# Patient Record
Sex: FEMALE | Race: BLACK OR AFRICAN AMERICAN | Employment: OTHER | URBAN - METROPOLITAN AREA
[De-identification: names, ages, dates, MRNs, and addresses within clinical notes are randomized per-mention and may not be internally consistent; named-entity substitution may affect disease eponyms.]

---

## 2017-02-28 ENCOUNTER — OFFICE VISIT (OUTPATIENT)
Dept: INTERNAL MEDICINE CLINIC | Age: 82
End: 2017-02-28

## 2017-02-28 VITALS
HEART RATE: 60 BPM | SYSTOLIC BLOOD PRESSURE: 124 MMHG | TEMPERATURE: 97.9 F | DIASTOLIC BLOOD PRESSURE: 66 MMHG | HEIGHT: 65 IN | WEIGHT: 136.4 LBS | OXYGEN SATURATION: 99 % | BODY MASS INDEX: 22.73 KG/M2

## 2017-02-28 DIAGNOSIS — I10 ESSENTIAL HYPERTENSION, BENIGN: ICD-10-CM

## 2017-02-28 DIAGNOSIS — G56.02 CARPAL TUNNEL SYNDROME ON LEFT: ICD-10-CM

## 2017-02-28 DIAGNOSIS — M85.80 OSTEOPENIA: ICD-10-CM

## 2017-02-28 DIAGNOSIS — E11.9 TYPE 2 DIABETES MELLITUS WITHOUT COMPLICATION, WITHOUT LONG-TERM CURRENT USE OF INSULIN (HCC): Primary | ICD-10-CM

## 2017-02-28 DIAGNOSIS — E78.00 PURE HYPERCHOLESTEROLEMIA: ICD-10-CM

## 2017-02-28 NOTE — MR AVS SNAPSHOT
Visit Information Date & Time Provider Department Dept. Phone Encounter #  
 2/28/2017  8:45 AM Anna Saenz, [address] [place] [address] [phone] [id number] Follow-up Instructions Return in about 6 months (around 8/28/2017) for dm-2 htn hld. Upcoming Health Maintenance Date Due DTaP/Tdap/Td series (1 - Tdap) 11/20/1956 ZOSTER VACCINE AGE 60> 11/20/1995 OSTEOPOROSIS SCREENING (DEXA) 11/20/2000 FOOT EXAM Q1 10/19/2016 MEDICARE YEARLY EXAM 2/9/2017 HEMOGLOBIN A1C Q6M 2/16/2017 MICROALBUMIN Q1 4/19/2017 LIPID PANEL Q1 4/19/2017 EYE EXAM RETINAL OR DILATED Q1 6/16/2017 GLAUCOMA SCREENING Q2Y 6/16/2018 COLONOSCOPY 2/28/2027 Allergies as of 2/28/2017  Review Complete On: 2/28/2017 By: Pieter Sahni LPN No Known Allergies Current Immunizations  Reviewed on 11/23/2010 Name Date H1N1 FLU VACCINE 1/12/2010 Influenza Vaccine 10/5/2015 Influenza Vaccine Split 11/23/2010 Pneumococcal Conjugate (PCV-13) 4/2/2015 Pneumococcal Polysaccharide (PPSV-23) 1/17/2013 Not reviewed this visit You Were Diagnosed With   
  
 Codes Comments Type 2 diabetes mellitus without complication, without long-term current use of insulin (HCC)    -  Primary ICD-10-CM: E11.9 ICD-9-CM: 250.00 Essential hypertension, benign     ICD-10-CM: I10 
ICD-9-CM: 401.1 Pure hypercholesterolemia     ICD-10-CM: E78.00 ICD-9-CM: 272.0 Osteopenia     ICD-10-CM: M85.80 ICD-9-CM: 733.90 Carpal tunnel syndrome on left     ICD-10-CM: G56.02 
ICD-9-CM: 354.0 Vitals BP  
  
  
  
  
  
 124/66 (BP 1 Location: Left arm, BP Patient Position: Sitting) BMI and BSA Data Body Mass Index Body Surface Area 23.05 kg/m 2 1.68 m 2 Preferred Pharmacy Pharmacy Name Phone [facility] [address] [place] [address] [phone] Your Updated Medication List  
  
   
 This list is accurate as of: 2/28/17  9:43 AM.  Always use your most recent med list. amLODIPine 10 mg tablet Commonly known as:  Mariluz Fraction Take 1 Tab by mouth daily. aspirin 81 mg tablet Take  by mouth daily. atorvastatin 20 mg tablet Commonly known as:  LIPITOR  
TAKE ONE TABLET BY MOUTH EVERY OTHER DAY  
  
 glucose blood VI test strips strip Commonly known as:  ASCENSIA AUTODISC VI, ONE TOUCH ULTRA TEST VI  
Use to check blood sugars once daily E11.9  
  
 hydrALAZINE 10 mg tablet Commonly known as:  APRESOLINE  
TAKE TWO AND ONE-HALF TABLETS BY MOUTH THREE TIMES DAILY Lancets Misc Use to check blood sugars once daily E11.9  
  
 lisinopril-hydroCHLOROthiazide 20-12.5 mg per tablet Commonly known as:  PRINZIDE, ZESTORETIC  
TAKE TWO TABLETS BY MOUTH ONCE DAILY  
  
 metFORMIN 500 mg tablet Commonly known as:  GLUCOPHAGE Take one tablet in the AM and two tablets in the PM  
  
 metoprolol succinate 100 mg tablet Commonly known as:  TOPROL-XL  
TAKE ONE TABLET BY MOUTH ONCE DAILY [place]-3-DHA-EPA-Fish Oil 1,000 mg (120 mg-180 mg) Cap Take  by mouth. temazepam 15 mg capsule Commonly known as:  RESTORIL  
TAKE ONE CAPSULE BY MOUTH AT BEDTIME AS NEEDED FOR SLEEP  
  
 VITAMIN D3 1,000 unit Cap Generic drug:  cholecalciferol Take  by mouth. We Performed the Following CBC W/O DIFF [07317 CPT(R)] HEMOGLOBIN A1C WITH EAG [35619 CPT(R)] LIPID PANEL [77992 CPT(R)] METABOLIC PANEL, COMPREHENSIVE [37812 CPT(R)] REFERRAL TO ORTHOPEDIC SURGERY [REF62 Custom] Comments:  
 Please evaluate patient for left carpal syndrome Follow-up Instructions Return in about 6 months (around 8/28/2017) for dm-2 htn hld. To-Do List   
 03/06/2017 Imaging:  DEXA BONE DENSITY STUDY AXIAL Referral Information Referral ID Referred By Referred To [id number] Jose [address], [address] Visits Status Start Date End Date 1 New Request 2/28/17 2/28/18 If your referral has a status of pending review or denied, additional information will be sent to support the outcome of this decision. Introducing [facility] & HEALTH SERVICES! [facility] introduces [email or web address] patient portal. Now you can access parts of your medical record, email your doctor's office, and request medication refills online. 1. In your internet browser, go to [email or web address] 2. Click on the First Time User? Click Here link in the Sign In box. You will see the New Member Sign Up page. 3. Enter your [email or web address] Access Code exactly as it appears below. You will not need to use this code after youve completed the sign-up process. If you do not sign up before the expiration date, you must request a new code. · [email or web address] Access Code: [facility] Expires: 5/29/2017  9:43 AM 
 
4. Enter the last four digits of your Social Security Number (xxxx) and Date of Birth (mm/dd/yyyy) as indicated and click Submit. You will be taken to the next sign-up page. 5. Create a [email or web address] ID. This will be your [email or web address] login ID and cannot be changed, so think of one that is secure and easy to remember. 6. Create a [email or web address] password. You can change your password at any time. 7. Enter your Password Reset Question and Answer. This can be used at a later time if you forget your password. 8. Enter your e-mail address. You will receive e-mail notification when new information is available in [address] 9. Click Sign Up. You can now view and download portions of your medical record. 10. Click the Download Summary menu link to download a portable copy of your medical information. If you have questions, please visit the Frequently Asked Questions section of the [email or web address] website. Remember, [email or web address] is NOT to be used for urgent needs. For medical emergencies, dial 911. Now available from your iPhone and Android! Please provide this summary of care documentation to your next provider. Your primary care clinician is listed as Jaren GALEANO. If you have any questions after today's visit, please call [phone].

## 2017-02-28 NOTE — PROGRESS NOTES
HISTORY OF PRESENT ILLNESS  Farrukh Cortes is a 80 y.o. female. HPI     F/u DM-2 HTN  C/o left hand numbness left hand fingerall 5 for years. sxs occur at night  S/p right carpal release years Dr Sofía Dent. fsbs < 100 in morning per patient, lowest was 80  Goes to [facility] q 6 months    Patient Active Problem List    Diagnosis Date Noted    Bilateral carotid artery stenosis 05/16/2016    Chest pain, unspecified 07/17/2012    Insomnia 01/12/2010    Family history of breast cancer in first degree relative 01/12/2010    Type 2 diabetes mellitus without complication ([facility] 91/74/0153    Essential hypertension, benign 06/29/2009    Pure hypercholesterolemia 06/29/2009     Current Outpatient Prescriptions   Medication Sig Dispense Refill    temazepam (RESTORIL) 15 mg capsule TAKE ONE CAPSULE BY MOUTH AT BEDTIME AS NEEDED FOR SLEEP 30 Cap 3    atorvastatin (LIPITOR) 20 mg tablet TAKE ONE TABLET BY MOUTH EVERY OTHER DAY 30 Tab 11    metoprolol succinate (TOPROL-XL) 100 mg tablet TAKE ONE TABLET BY MOUTH ONCE DAILY 30 Tab 11    lisinopril-hydrochlorothiazide (PRINZIDE, ZESTORETIC) 20-12.5 mg per tablet TAKE TWO TABLETS BY MOUTH ONCE DAILY 60 Tab 11    glucose blood VI test strips (ASCENSIA AUTODISC VI, ONE TOUCH ULTRA TEST VI) strip Use to check blood sugars once daily E11.9 100 Strip 11    Lancets misc Use to check blood sugars once daily E11.9 100 Each 11    metFORMIN (GLUCOPHAGE) 500 mg tablet Take one tablet in the AM and two tablets in the PM 90 Tab 10    amLODIPine (NORVASC) 10 mg tablet Take 1 Tab by mouth daily. 90 Tab 3    hydrALAZINE (APRESOLINE) 10 mg tablet TAKE TWO AND ONE-HALF TABLETS BY MOUTH THREE TIMES DAILY 225 Tab 1    Cholecalciferol, Vitamin D3, (VITAMIN D3) 1,000 unit Cap Take  by mouth.  Omega-3-DHA-EPA-Fish Oil 1,000 (120-180) mg Cap Take  by mouth.  aspirin 81 mg Tab Take  by mouth daily.        No Known Allergies   Lab Results  Component Value Date/Time Hemoglobin A1c 6.9 08/16/2016 09:21 AM   Hemoglobin A1c 7.0 07/06/2012 09:15 AM   Hemoglobin A1c 6.8 01/05/2012 09:09 AM   Hemoglobin A1c, External 7.0 on 10/02/2014 Dr. Rosendo Pop 10/02/2014   Hemoglobin A1c, External 6.6 on 10/2/14 Dr. Rosnedo Pop 10/02/2014   Glucose 115 08/16/2016 09:21 AM   Glucose (POC) 113 07/25/2012 01:49 PM   Microalb/Creat ratio (ug/mg creat.) <5.6 04/19/2016 09:12 AM   LDL, calculated 53 04/19/2016 09:12 AM   Creatinine 0.73 08/16/2016 09:21 AM      Lab Results  Component Value Date/Time   Cholesterol, total 144 04/19/2016 09:12 AM   HDL Cholesterol 78 04/19/2016 09:12 AM   LDL, calculated 53 04/19/2016 09:12 AM   Triglyceride 65 04/19/2016 09:12 AM   CHOL/HDL Ratio 2.2 07/30/2010 09:21 AM       Lab Results  Component Value Date/Time   GFR est AA 90 08/16/2016 09:21 AM   GFR est non-AA 78 08/16/2016 09:21 AM   Creatinine 0.73 08/16/2016 09:21 AM   BUN 10 08/16/2016 09:21 AM   Sodium 146 08/16/2016 09:21 AM   Potassium 4.4 08/16/2016 09:21 AM   Chloride 100 08/16/2016 09:21 AM   CO2 24 08/16/2016 09:21 AM         ROS    Physical Exam   Constitutional: She appears well-developed and well-nourished. No distress. Appears stated age   Cardiovascular: Normal rate, regular rhythm and normal heart sounds. Exam reveals no gallop and no friction rub. No murmur heard. Pulmonary/Chest: Effort normal and breath sounds normal. No respiratory distress. She has no wheezes. Abdominal: Soft. Bowel sounds are normal. She exhibits no distension and no mass. There is no tenderness. There is no rebound and no guarding. Musculoskeletal: She exhibits no edema. Neurological: She is alert. Positive Tinels left wrist  Hand  ? Slightly diminished   Psychiatric: She has a normal mood and affect. Nursing note and vitals reviewed. ASSESSMENT and PLAN  Allielove was seen today for diabetes and hypertension.     Diagnoses and all orders for this visit:    Type 2 diabetes mellitus without complication, without long-term current use of insulin (HCC)  -     CBC W/O DIFF  -     METABOLIC PANEL, COMPREHENSIVE  -     HEMOGLOBIN A1C WITH EAG   Good control per home readings   Foot exam next visit    Essential hypertension, benign  -     METABOLIC PANEL, COMPREHENSIVE   controlled    Pure hypercholesterolemia  -     METABOLIC PANEL, COMPREHENSIVE  -     LIPID PANEL   Continue statin    Osteopenia  -     DEXA BONE DENSITY STUDY AXIAL; Future   Prior study from former PCP about 4 yrs ago per pt--osteopenia    Carpal tunnel syndrome on left  -     REFERRAL TO ORTHOPEDIC SURGERY      Follow-up Disposition:  Return in about 6 months (around 8/28/2017) for dm-2 htn hld.

## 2017-03-01 LAB
ALBUMIN SERPL-MCNC: 4.3 G/DL (ref 3.5–4.7)
ALBUMIN/GLOB SERPL: 1.7 {RATIO} (ref 1.1–2.5)
ALP SERPL-CCNC: 92 IU/L (ref 39–117)
ALT SERPL-CCNC: 13 IU/L (ref 0–32)
AST SERPL-CCNC: 21 IU/L (ref 0–40)
BILIRUB SERPL-MCNC: 0.2 MG/DL (ref 0–1.2)
BUN SERPL-MCNC: 12 MG/DL (ref 8–27)
BUN/CREAT SERPL: 17 (ref 11–26)
CALCIUM SERPL-MCNC: 9.8 MG/DL (ref 8.7–10.3)
CHLORIDE SERPL-SCNC: 94 MMOL/L (ref 96–106)
CHOLEST SERPL-MCNC: 153 MG/DL (ref 100–199)
CO2 SERPL-SCNC: 24 MMOL/L (ref 18–29)
CREAT SERPL-MCNC: 0.71 MG/DL (ref 0.57–1)
ERYTHROCYTE [DISTWIDTH] IN BLOOD BY AUTOMATED COUNT: 14.7 % (ref 12.3–15.4)
EST. AVERAGE GLUCOSE BLD GHB EST-MCNC: 148 MG/DL
GLOBULIN SER CALC-MCNC: 2.6 G/DL (ref 1.5–4.5)
GLUCOSE SERPL-MCNC: 106 MG/DL (ref 65–99)
HBA1C MFR BLD: 6.8 % (ref 4.8–5.6)
HCT VFR BLD AUTO: 35.1 % (ref 34–46.6)
HDLC SERPL-MCNC: 69 MG/DL
HGB BLD-MCNC: 11.6 G/DL (ref 11.1–15.9)
LDLC SERPL CALC-MCNC: 71 MG/DL (ref 0–99)
MCH RBC QN AUTO: 28.4 PG (ref 26.6–33)
MCHC RBC AUTO-ENTMCNC: 33 G/DL (ref 31.5–35.7)
MCV RBC AUTO: 86 FL (ref 79–97)
PLATELET # BLD AUTO: 270 X10E3/UL (ref 150–379)
POTASSIUM SERPL-SCNC: 4 MMOL/L (ref 3.5–5.2)
PROT SERPL-MCNC: 6.9 G/DL (ref 6–8.5)
RBC # BLD AUTO: 4.09 X10E6/UL (ref 3.77–5.28)
SODIUM SERPL-SCNC: 135 MMOL/L (ref 134–144)
TRIGL SERPL-MCNC: 64 MG/DL (ref 0–149)
VLDLC SERPL CALC-MCNC: 13 MG/DL (ref 5–40)
WBC # BLD AUTO: 4.7 X10E3/UL (ref 3.4–10.8)

## 2017-04-20 ENCOUNTER — TELEPHONE (OUTPATIENT)
Dept: INTERNAL MEDICINE CLINIC | Age: 82
End: 2017-04-20

## 2017-08-17 DIAGNOSIS — E11.9 TYPE 2 DIABETES MELLITUS WITHOUT COMPLICATION, WITHOUT LONG-TERM CURRENT USE OF INSULIN (HCC): ICD-10-CM

## 2017-08-17 RX ORDER — LANCETS
EACH MISCELLANEOUS
Qty: 100 EACH | Refills: 11 | Status: SHIPPED | OUTPATIENT
Start: 2017-08-17 | End: 2018-08-27 | Stop reason: SDUPTHER

## 2017-08-28 ENCOUNTER — OFFICE VISIT (OUTPATIENT)
Dept: INTERNAL MEDICINE CLINIC | Age: 82
End: 2017-08-28

## 2017-08-28 VITALS
SYSTOLIC BLOOD PRESSURE: 123 MMHG | DIASTOLIC BLOOD PRESSURE: 66 MMHG | HEIGHT: 65 IN | WEIGHT: 135 LBS | BODY MASS INDEX: 22.49 KG/M2 | OXYGEN SATURATION: 99 % | TEMPERATURE: 98 F | HEART RATE: 59 BPM

## 2017-08-28 DIAGNOSIS — E78.00 PURE HYPERCHOLESTEROLEMIA: ICD-10-CM

## 2017-08-28 DIAGNOSIS — Z78.0 ASYMPTOMATIC UNCOMPLICATED MENOPAUSE: ICD-10-CM

## 2017-08-28 DIAGNOSIS — I10 ESSENTIAL HYPERTENSION, BENIGN: ICD-10-CM

## 2017-08-28 DIAGNOSIS — E11.9 TYPE 2 DIABETES MELLITUS WITHOUT COMPLICATION, WITHOUT LONG-TERM CURRENT USE OF INSULIN (HCC): ICD-10-CM

## 2017-08-28 DIAGNOSIS — Z00.00 MEDICARE ANNUAL WELLNESS VISIT, SUBSEQUENT: Primary | ICD-10-CM

## 2017-08-28 DIAGNOSIS — I65.23 BILATERAL CAROTID ARTERY STENOSIS: ICD-10-CM

## 2017-08-28 NOTE — MR AVS SNAPSHOT
Visit Information Date & Time Provider Department Dept. Phone Encounter #  
 8/28/2017  8:45 AM Guanakito Blakely, [address] [place] [address],[address] [phone] [id number] Follow-up Instructions Return in about 6 months (around 2/28/2018) for dm-2 htn hld . Upcoming Health Maintenance Date Due DTaP/Tdap/Td series (1 - Tdap) 11/20/1956 ZOSTER VACCINE AGE 60> 9/20/1995 OSTEOPOROSIS SCREENING (DEXA) 11/20/2000 FOOT EXAM Q1 10/19/2016 MEDICARE YEARLY EXAM 2/9/2017 MICROALBUMIN Q1 4/19/2017 EYE EXAM RETINAL OR DILATED Q1 6/16/2017 INFLUENZA AGE 9 TO ADULT 8/1/2017 HEMOGLOBIN A1C Q6M 8/28/2017 LIPID PANEL Q1 2/28/2018 GLAUCOMA SCREENING Q2Y 6/16/2018 COLONOSCOPY 2/28/2027 Allergies as of 8/28/2017  Review Complete On: 8/28/2017 By: Twin Dorsey LPN No Known Allergies Current Immunizations  Reviewed on 11/23/2010 Name Date H1N1 FLU VACCINE 1/12/2010 Influenza Vaccine 10/5/2015 Influenza Vaccine Split 11/23/2010 Pneumococcal Conjugate (PCV-13) 4/2/2015 Pneumococcal Polysaccharide (PPSV-23) 1/17/2013 Not reviewed this visit You Were Diagnosed With   
  
 Codes Comments Medicare annual wellness visit, subsequent    -  Primary ICD-10-CM: Z00.00 ICD-9-CM: V70.0 Type 2 diabetes mellitus without complication, without long-term current use of insulin (HCC)     ICD-10-CM: E11.9 ICD-9-CM: 250.00 Essential hypertension, benign     ICD-10-CM: I10 
ICD-9-CM: 401.1 Pure hypercholesterolemia     ICD-10-CM: E78.00 ICD-9-CM: 272.0 Asymptomatic uncomplicated menopause     ICD-10-CM: Z78.0 ICD-9-CM: V49.81 Bilateral carotid artery stenosis     ICD-10-CM: I65.23 ICD-9-CM: 433.10, 433.30 Vitals BP Pulse Temp Height(growth percentile) Weight(growth percentile) SpO2  
 123/66 (BP 1 Location: Left arm, BP Patient Position: Sitting) (!) 59 98 °F (36.7 °C) (Oral) 5' 4.5\" (1.638 m) 135 lb (61.2 kg) 99% BMI OB Status Smoking Status 22.81 kg/m2 Postmenopausal Never Smoker BMI and BSA Data Body Mass Index Body Surface Area  
 22.81 kg/m 2 1.67 m 2 Preferred Pharmacy Pharmacy Name Phone [facility] [address] [place] [address] [phone] Your Updated Medication List  
  
   
This list is accurate as of: 8/28/17  9:16 AM.  Always use your most recent med list. amLODIPine 10 mg tablet Commonly known as:  [place] TAKE ONE TABLET BY MOUTH ONCE DAILY  
  
 aspirin 81 mg tablet Take  by mouth daily. atorvastatin 20 mg tablet Commonly known as:  LIPITOR  
TAKE ONE TABLET BY MOUTH EVERY OTHER DAY  
  
 glucose blood VI test strips strip Commonly known as:  ASCENSIA AUTODISC VI, ONE TOUCH ULTRA TEST VI  
Use to check blood sugars once daily E11.9  
  
 hydrALAZINE 10 mg tablet Commonly known as:  APRESOLINE  
TAKE TWO AND ONE-HALF TABLETS BY MOUTH THREE TIMES DAILY Lancets Misc Use to check blood sugars once daily E11.9  
  
 lisinopril-hydroCHLOROthiazide 20-12.5 mg per tablet Commonly known as:  PRINZIDE, ZESTORETIC  
TAKE TWO TABLETS BY MOUTH ONCE DAILY  
  
 metFORMIN 500 mg tablet Commonly known as:  GLUCOPHAGE  
TAKE ONE TABLET BY MOUTH IN THE MORNING AND TAKE TWO TABLETS IN THE EVENING  
  
 metoprolol succinate 100 mg tablet Commonly known as:  TOPROL-XL  
TAKE ONE TABLET BY MOUTH ONCE DAILY Al[place]-3-DHA-EPA-Fish Oil 1,000 mg (120 mg-180 mg) Cap Take  by mouth. temazepam 15 mg capsule Commonly known as:  RESTORIL  
TAKE ONE CAPSULE BY MOUTH AT BEDTIME AS NEEDED FOR SLEEP  
  
 VITAMIN D3 1,000 unit Cap Generic drug:  cholecalciferol Take  by mouth. Follow-up Instructions Return in about 6 months (around 2/28/2018) for dm-2 htn hld . To-Do List   
 09/04/2017 Imaging:  DEXA BONE DENSITY STUDY AXIAL   
  
 09/18/2017   Imaging:  DUPLEX CAROTID BILATERAL   
  
  
 Patient Instructions PATIENT INSTRUCTIONS:  
Prepared by Juan Luis Brandt Today's date: 8-28-17 Medicare Part B Preventive Services Guidelines/Limitations Date last completed and Frequency Due Date Bone Mass Measurement 
(age 72 & older, biennial) Requires diagnosis related to osteoporosis or estrogen deficiency. Biennial benefit unless patient has history of long-term glucocorticoid tx or baseline is needed because initial test was by other method, or for patients with vertebral abnormalities on x-ray Completed: No record found Referral given Feb.2017 Recommended every 2 years Due: NOW At your discretion OR As recommended by your PCP or Specialist 
  
Cardiovascular Screening Blood Tests (every 5 years or annual if on cholesterol lowering meds) Total cholesterol, HDL, Triglycerides Order as a panel if possible Completed:  
2-28-17 As recommended by your PCP Due: Feb. 2018 OR As recommended by your PCP or Specialist  
Hepatitis B vaccines (covered for patients at high risk- hemophilia, ESRD, DM, body fluid contact Three shot series covered once N/A N/A Zoster Shingles vaccine Covered by Medicare Part D through the pharmacy- PCP provides prescription Completed: No record found Recommended once over age 61  Due: NOW At your discretion This is a one-time vaccine Pneumococcal vaccine (once after age 72) 
 
 
_________________ Prevnar 15  
 
 
 
 
___________________ Completed:  
1-17-13 Recommended once over the age of 72 
__________________ Completed: 
4-2-15 Recommended once over the age of 72 This is a one-time  
vaccine 
 
________________ This is a one-time  
vaccine Colorectal Cancer Screening 
-Fecal occult blood test (annual) -Flexible sigmoidoscopy (5y) 
-Screening colonoscopy (10y) -Barium Enema Age 49-80; After age [de-identified] if history of abnormal results Completed:  
 2-9-07 with Dr. Donnell Abreu Recommended every 5 to 10 years  Due: Not indicated after age [de-identified] Unless recommended by your PCP or Specialist 
  
Counseling to Prevent Tobacco Use (up to 8 sessions per year) - Counseling greater than 3 and up to 10 minutes - Counseling greater than 10 minutes Patients must be asymptomatic of tobacco-related conditions to receive as preventive service N/A N/A Diabetes Screening Tests (at least every 3 years, Medicare covers annually or at 6-month intervals for prediabetic patients) Fasting blood sugar (FBS) or glucose tolerance test (GTT) Patient must be diagnosed with one of the following: 
-Hypertension, Dyslipidemia, obesity, previous impaired FBS or GTT 
Or any two of the following: overweight, FH of diabetes, age ? 72, history of gestational diabetes, birth of baby weighing more than 9 pounds Completed: Your A1c was 6.8 on 2-28-17 Recommended every 3 years for non-diabetics Recommended every 3-6 months for Pre-Diabetics and Diabetics Due: NOW 
 
OR 
As recommended by your PCP or Specialist 
  
Lung Cancer Screening-with low dose CT for patients who meet all criteria: 
-Age 50-69 
-either a current smoker or have quit in the past 15 yrs 
-have a smoking history of 30 pack years or more 
-have a written order from MD for screening Covered once every 12 months  N/A N/A Glaucoma Screening (no USPSTF recommendation) Covered for high risk patients such as patients with Diabetes mellitus, family history of glaucoma, , age 48 or over,  American, age 72 or over Completed:  
5-16-16 with Dr. Han Ross at [facility] Recommended annually Due: NOW Seasonal Influenza Vaccination (annually)  Completed: 
10-3-16 Recommended Annually Due: FALL 2017 At your discretion TDAP Vaccination Covered by Medicare part D through the pharmacy- PCP provides prescription Completed: No record found Recommended every 10 years Due: NOW At your discretion Screening Mammography (biennial age 54-69) Annually (age 36 or over) Completed:  
7-5-16 Due: Not indicated after age 76 Unless recommended by your PCP or Specialist 
  
Screening Pap Tests and Pelvic Examination (up to age 79 and after 79 if unknown history or abnormal study last 10 years) Every 24 months except high risk Completed: 
2-9-16 As recommended by your PCP or Specialist 
 Due: 
Not indicated after age 79 Unless recommended by your PCP or Specialist 
  
HIV Screening (includes patients at high risk and includes any patient that requests the test and pregnant women Covered once every 12 months or up to 3 times during pregnancy N/A N/A Hepatitis C screening tests Indicated for patients with at least one of the following: current or past history of IV drug use, those who had blood transfusion before 1992, those born between [facility]. N/A N/A Please contact your RN/Nurse Navigator with any questions about your visit or instructions today. Thank you for the opportunity for us to participate in your care. Introducing [facility]! Michael Bern[facility] introduces [employer] patient portal. Now you can access parts of your medical record, email your doctor's office, and request medication refills online. 1. In your internet browser, go to [email or web address] 2. Click on the First Time User? Click Here link in the Sign In box. You will see the New Member Sign Up page. 3. Enter your [employer] Access Code exactly as it appears below. You will not need to use this code after youve completed the sign-up process. If you do not sign up before the expiration date, you must request a new code. · [employer] Access Code: [email or web address] Expires: 11/26/2017  9:15 AM 
 
4. Enter the last four digits of your Social Security Number (xxxx) and Date of Birth (mm/dd/yyyy) as indicated and click Submit. You will be taken to the next sign-up page. 5. Create a OpenLogic ID. This will be your [employer] login ID and cannot be changed, so think of one that is secure and easy to remember. 6. Create a [employer] password. You can change your password at any time. 7. Enter your Password Reset Question and Answer. This can be used at a later time if you forget your password. 8. Enter your e-mail address. You will receive e-mail notification when new information is available in [address] 9. Click Sign Up. You can now view and download portions of your medical record. 10. Click the Download Summary menu link to download a portable copy of your medical information. If you have questions, please visit the Frequently Asked Questions section of the [employer] website. Remember, [employer] is NOT to be used for urgent needs. For medical emergencies, dial 911. Now available from your iPhone and Android! Please provide this summary of care documentation to your next provider. Your primary care clinician is listed as Martha GALEANO. If you have any questions after today's visit, please call [phone].

## 2017-08-28 NOTE — PROGRESS NOTES
Patient is her today for her Medicare wellness exam  6 month follow up visit. Patient decline retinal eye exam  On today's visit she has appt. with her eye doctor soon.

## 2017-08-28 NOTE — PATIENT INSTRUCTIONS
PATIENT INSTRUCTIONS:   Prepared by Vishnu Hamilton  Today's date: 8-28-17  Medicare Part B Preventive Services Guidelines/Limitations Date last completed and Frequency Due Date   Bone Mass Measurement  (age 72 & older, biennial) Requires diagnosis related to osteoporosis or estrogen deficiency. Biennial benefit unless patient has history of long-term glucocorticoid tx or baseline is needed because initial test was by other method, or for patients with vertebral abnormalities on x-ray Completed:   No record found  Referral given Feb.2017    Recommended every 2 years Due: NOW    At your discretion    OR  As recommended by your PCP or Specialist     Cardiovascular Screening Blood Tests (every 5 years or annual if on cholesterol lowering meds)  Total cholesterol, HDL, Triglycerides Order as a panel if possible Completed:   2-28-17    As recommended by your PCP Due: Feb. 2018    OR  As recommended by your PCP or Specialist   Hepatitis B vaccines  (covered for patients at high risk- hemophilia, ESRD, DM, body fluid contact   Three shot series covered once     Prior to penitentiary    Zoster Shingles vaccine Covered by Medicare Part D through the pharmacy- PCP provides prescription Completed:   No record found    Recommended once over age 61  Due: NOW    At your discretion    This is a one-time vaccine   Pneumococcal vaccine (once after age 72)      _________________ Dorian Kemps 15           ___________________ Completed:   1-17-13    Recommended once over the age of 72  __________________  Completed:  4-2-15    Recommended once over the age of 72     This is a one-time   vaccine    ________________      This is a one-time   vaccine   Colorectal Cancer Screening  -Fecal occult blood test (annual)  -Flexible sigmoidoscopy (5y)  -Screening colonoscopy (10y)  -Barium Enema Age 49-80;  After age [de-identified] if history of abnormal results Completed:    2-9-07 with Dr. Jin Ferrara    Recommended every 5 to 10 years  Due:  Not indicated after age 80    Unless recommended by your PCP or Specialist     Counseling to Prevent Tobacco Use (up to 8 sessions per year)  - Counseling greater than 3 and up to 10 minutes  - Counseling greater than 10 minutes   Patients must be asymptomatic of tobacco-related conditions to receive as preventive service N/A N/A   Diabetes Screening Tests (at least every 3 years, Medicare covers annually or at 6-month intervals for prediabetic patients)    Fasting blood sugar (FBS) or glucose tolerance test (GTT) Patient must be diagnosed with one of the following:  -Hypertension, Dyslipidemia, obesity, previous impaired FBS or GTT  Or any two of the following: overweight, FH of diabetes, age ? 72, history of gestational diabetes, birth of baby weighing more than 9 pounds Completed:    Your A1c was 6.8 on 2-28-17    Recommended every 3 years for non-diabetics      Recommended every 3-6 months for Pre-Diabetics and Diabetics Due: NOW    OR  As recommended by your PCP or Specialist     Lung Cancer Screening-with low dose CT for patients who meet all criteria:  -Age 50-69  -either a current smoker or have quit in the past 15 yrs  -have a smoking history of 30 pack years or more  -have a written order from MD for screening   Covered once every 12 months  N/A N/A   Glaucoma Screening (no USPSTF recommendation) Covered for high risk patients such as patients with Diabetes mellitus, family history of glaucoma, , age 48 or over,  American, age 72 or over Completed:   5-16-16 with Dr. Alfredito Lynne at [facility]    Recommended annually Due: NOW   Seasonal Influenza Vaccination (annually)  Completed:  10-3-16     Recommended Annually Due: FALL 2017    At your discretion   TDAP Vaccination Covered by Medicare part D through the pharmacy- PCP provides prescription Completed:   No record found    Recommended every 10 years Due: NOW    At your discretion   Screening Mammography (biennial age 54-69) Annually (age 36 or over) Completed:   8-4-17 Due: Not indicated after age 76     Unless recommended by your PCP or Specialist     Screening Pap Tests and Pelvic Examination (up to age 79 and after 79 if unknown history or abnormal study last 10 years)   Every 24 months except high risk Completed:  2-9-16      As recommended by your PCP or Specialist   Due:  Not indicated after age 79    Unless recommended by your PCP or Specialist     HIV Screening (includes patients at high risk and includes any patient that requests the test and pregnant women   Covered once every 12 months or up to 3 times during pregnancy N/A N/A   Hepatitis C screening tests Indicated for patients with at least one of the following: current or past history of IV drug use, those who had blood transfusion before 1992, those born between [facility]. N/A N/A     Please contact your RN/Nurse Navigator with any questions about your visit or instructions today. Thank you for the opportunity for us to participate in your care.

## 2017-08-28 NOTE — PROGRESS NOTES
HISTORY OF PRESENT ILLNESS  Kash Oliveros is a 80 y.o. female. HPI      F/u DM-2 HTN and here for wellness visit  Went to [facility] lat week and had labs--a1c, lipids, urine checked per pt  C/o left hand numbness left hand fingerall 5 for years. sxs occur at night  S/p right carpal release years Dr Adam Beauchamp.    fsbs 107-110  in morning per patient, lowest was 80's  Goes to [facility] q 6 months  No cp or sob sxs  Had some fuuny sensation like gas in chest yesterday and checked pulse which was slipping beats  Had mobile mammogram from [facility] recently which was wnl per pt    Patient Active Problem List    Diagnosis Date Noted    Bilateral carotid artery stenosis 05/16/2016    Chest pain, unspecified 07/17/2012    Insomnia 01/12/2010    Family history of breast cancer in first degree relative 01/12/2010    [facility] 57/87/7944    Essential hypertension, benign 06/29/2009    Pure hypercholesterolemia 06/29/2009     Current Outpatient Prescriptions   Medication Sig Dispense Refill    Lancets misc Use to check blood sugars once daily E11.9 100 Each 11    glucose blood VI test strips (ASCENSIA AUTODISC VI, ONE TOUCH ULTRA TEST VI) strip Use to check blood sugars once daily E11.9 100 Strip 11    metoprolol succinate (TOPROL-XL) 100 mg tablet TAKE ONE TABLET BY MOUTH ONCE DAILY 30 Tab 11    lisinopril-hydroCHLOROthiazide (PRINZIDE, ZESTORETIC) 20-12.5 mg per tablet TAKE TWO TABLETS BY MOUTH ONCE DAILY 60 Tab 11    amLODIPine (NORVASC) 10 mg tablet TAKE ONE TABLET BY MOUTH ONCE DAILY 90 Tab 3    metFORMIN (GLUCOPHAGE) 500 mg tablet TAKE ONE TABLET BY MOUTH IN THE MORNING AND TAKE TWO TABLETS IN THE EVENING 90 Tab 6    temazepam (RESTORIL) 15 mg capsule TAKE ONE CAPSULE BY MOUTH AT BEDTIME AS NEEDED FOR SLEEP 30 Cap 5    atorvastatin (LIPITOR) 20 mg tablet TAKE ONE TABLET BY MOUTH EVERY OTHER DAY 30 Tab 11    hydrALAZINE (APRESOLINE) 10 mg tablet TAKE TWO AND ONE-HALF TABLETS BY MOUTH THREE TIMES DAILY 225 Tab 1    Cholecalciferol, Vitamin D3, (VITAMIN D3) 1,000 unit Cap Take  by mouth.  Omega-3-DHA-EPA-Fish Oil 1,000 (120-180) mg Cap Take  by mouth.  aspirin 81 mg Tab Take  by mouth daily. No Known Allergies   Lab Results  Component Value Date/Time   Hemoglobin A1c 6.8 02/28/2017 09:58 AM   Hemoglobin A1c 6.9 08/16/2016 09:21 AM   Hemoglobin A1c 7.0 07/06/2012 09:15 AM   Hemoglobin A1c, External 7.0 on 10/02/2014 Dr. Demar Armijo 10/02/2014   Hemoglobin A1c, External 6.6 on 10/2/14 Dr. Demar Armijo 10/02/2014   Glucose 106 02/28/2017 09:58 AM   Glucose (POC) 113 07/25/2012 01:49 PM   Microalb/Creat ratio (ug/mg creat.) <5.6 04/19/2016 09:12 AM   LDL, calculated 71 02/28/2017 09:58 AM   Creatinine 0.71 02/28/2017 09:58 AM      Lab Results  Component Value Date/Time   Cholesterol, total 153 02/28/2017 09:58 AM   Cholesterol (POC) 160 01/28/2011 08:38 AM   HDL Cholesterol 69 02/28/2017 09:58 AM   LDL, calculated 71 02/28/2017 09:58 AM   LDL Cholesterol (POC) 45 01/28/2011 08:38 AM   LDL-C, External 71 on 10/02/14 Dr. Demar Armijo 10/02/2014   Triglyceride 64 02/28/2017 09:58 AM   Triglycerides (POC) 281 01/28/2011 08:38 AM   CHOL/HDL Ratio 2.2 07/30/2010 09:21 AM     Lab Results  Component Value Date/Time   GFR est non-AA 80 02/28/2017 09:58 AM   GFR est AA 92 02/28/2017 09:58 AM   Creatinine 0.71 02/28/2017 09:58 AM   BUN 12 02/28/2017 09:58 AM   Sodium 135 02/28/2017 09:58 AM   Potassium 4.0 02/28/2017 09:58 AM   Chloride 94 02/28/2017 09:58 AM   CO2 24 02/28/2017 09:58 AM          ROS    Physical Exam   Constitutional: She appears well-developed and well-nourished. Appears stated age   Cardiovascular: Normal rate, regular rhythm and normal heart sounds. Exam reveals no gallop and no friction rub. No murmur heard. Pulmonary/Chest: Effort normal and breath sounds normal. No respiratory distress. She has no wheezes. Abdominal: Soft. Bowel sounds are normal.   Musculoskeletal: She exhibits no edema. Neurological: She is alert. Psychiatric: She has a normal mood and affect. Nursing note and vitals reviewed. ASSESSMENT and PLAN  Diagnoses and all orders for this visit:    1. Medicare annual wellness visit, subsequent    2. Type 2 diabetes mellitus without complication, without long-term current use of insulin (HCC)   Controlled per home readings   Request labs and notes from [facility]  3. Essential hypertension, benign   Good control  4. Pure hypercholesterolemia   Continue statin--last LDL at goal 71 Feb 2017    5. Carotid stenosis   Repeat carotid dopplers ordered  6. Menopause   dexa ordered   Pt has prior dexa with former PCP Dr Rosita Shrestha ? Osteopenia of spine?     Preventive   Pt will get flu shot at [facility]    Follow-up Disposition: Not on File

## 2017-08-28 NOTE — PROGRESS NOTES
Medicare Wellness Visit      Kash Oliveros is a 80 y.o. female and presents for Annual Medicare Wellness Visit. Assessment of cognitive impairment: Alert and oriented x 3. Depression Screen:   PHQ over the last two weeks 8/28/2017   Little interest or pleasure in doing things Not at all   Feeling down, depressed or hopeless Not at all   Total Score PHQ 2 0       Fall Risk Assessment:    Fall Risk Assessment, last 12 mths 8/28/2017   Able to walk? Yes   Fall in past 12 months? No       Abuse Screen:   Abuse Screening Questionnaire 8/28/2017   Do you ever feel afraid of your partner? N   Are you in a relationship with someone who physically or mentally threatens you? N   Is it safe for you to go home? Y       Activities of Daily Living:  Self-care. Requires assistance with: no ADLs  Patient handle his/her own medications  yes Use of pill box  yes  Activities of Daily Living:   ADL Assessment 8/28/2017   Feeding yourself No Help Needed   Getting from bed to chair No Help Needed   Getting dressed No Help Needed   Bathing or showering No Help Needed   Walk across the room (includes cane/walker) No Help Needed   Using the telphone No Help Needed   Taking your medications No Help Needed   Preparing meals No Help Needed   Managing money (expenses/bills) No Help Needed   Moderately strenuous housework (laundry) No Help Needed   Shopping for personal items (toiletries/medicines) No Help Needed   Shopping for groceries No Help Needed   Driving No Help Needed   Climbing a flight of stairs No Help Needed   Getting to places beyond walking distances No Help Needed       Health Maintenance:  Daily Aspirin: yes  Bone Density: not up to date - reviewed and recommended. Patient will consider for future  Glaucoma Screening: no - patient last saw Dr. Chung Bullock [facility] May 2016. Recommended schedule follow up.   Patient agrees to schedule  Immunizations:    Tetanus: not up to date - reviewed and recommended. Patient will consider for future. Influenza: up to date. Shingles: not up to date - reviewed and recommended. Patient will consider for future. PPSV-23: up to date. Prevnar-13: up to date. Cancer screening:    Cervical: up to date - last exam 2-9-16. Not indicated after age 79. Breast: up to date. Last mammogram 8-4-17. Not indicated after age 76. Patient reports she will continue with annual exams due to family history of breast cancer. Colon: last colonoscopy with Dr Aleta Olivarez 2-9-07. Not indicated after age [de-identified]. Alcohol Risk Screen:   On any occasion during the past 3 months, have you had more than 3 drinks(female) or 4 drinks (male) containing alcohol in one? No  Do you average more than 7 drinks (female) or 14 drinks (male) per week? No  Type and amount:none    Hearing Loss:  Hearing is good. denies any hearing loss. Reports \"sometimes not as sharp as it used to be when I was young\"  Does not wear hearing aids. No difficulty understanding writer during this interview    Vision Loss:   Wears glasses, contact lenses, or have any other visual impairment  Yes wears glasses sometimes for reading    Adult Nutrition Screen:  No risk factors noted. Reports good appetite    Advance Care Planning:   End of Life Planning: has NO advanced directive  - add't info provided      Medications/Allergies: Reviewed with patient  Prior to Admission medications    Medication Sig Start Date End Date Taking?  Authorizing Provider   Lancets misc Use to check blood sugars once daily E11.9 8/17/17  Yes Talita Morales MD   glucose blood VI test strips (ASCENSIA AUTODISC VI, ONE TOUCH ULTRA TEST VI) strip Use to check blood sugars once daily E11.9 8/17/17  Yes Talita Morales MD   metoprolol succinate (TOPROL-XL) 100 mg tablet TAKE ONE TABLET BY MOUTH ONCE DAILY 7/11/17  Yes Talita Morales MD   lisinopril-hydroCHLOROthiazide (PRINZIDE, ZESTORETIC) 20-12.5 mg per tablet TAKE TWO TABLETS BY MOUTH ONCE DAILY 6/19/17  Yes Reta Delaney MD   amLODIPine (NORVASC) 10 mg tablet TAKE ONE TABLET BY MOUTH ONCE DAILY 5/30/17  Yes Reta Delaney MD   metFORMIN (GLUCOPHAGE) 500 mg tablet TAKE ONE TABLET BY MOUTH IN THE MORNING AND TAKE TWO TABLETS IN THE EVENING 5/18/17  Yes Reta Delaney MD   temazepam (RESTORIL) 15 mg capsule TAKE ONE CAPSULE BY MOUTH AT BEDTIME AS NEEDED FOR SLEEP 4/20/17  Yes Reta Delaney MD   atorvastatin (LIPITOR) 20 mg tablet TAKE ONE TABLET BY MOUTH EVERY OTHER DAY 11/1/16  Yes Reta Delaney MD   hydrALAZINE (APRESOLINE) 10 mg tablet TAKE TWO AND ONE-HALF TABLETS BY MOUTH THREE TIMES DAILY 12/3/12  Yes Christ Davalos MD   Cholecalciferol, Vitamin D3, (VITAMIN D3) 1,000 unit Cap Take  by mouth. Yes Historical Provider   Omega-3-DHA-EPA-Fish Oil 1,000 (120-180) mg Cap Take  by mouth. Yes Historical Provider   aspirin 81 mg Tab Take  by mouth daily. 1/12/10  Yes Historical Provider       No Known Allergies    Objective:  Visit Vitals    /66 (BP 1 Location: Left arm, BP Patient Position: Sitting)    Pulse (!) 59    Temp 98 °F (36.7 °C) (Oral)    Ht 5' 4.5\" (1.638 m)    Wt 135 lb (61.2 kg)    SpO2 99%    BMI 22.81 kg/m2    Body mass index is 22.81 kg/(m^2). Problem List: Reviewed with patient and discussed risk factors.     Patient Active Problem List   Diagnosis Code    Type 2 diabetes mellitus without complication (HCC) N20.2    Essential hypertension, benign I10    Pure hypercholesterolemia E78.00    Insomnia G47.00    Family history of breast cancer in first degree relative Z80.3    Chest pain, unspecified R07.9    Bilateral carotid artery stenosis I65.23       PSH: Reviewed with patient  Past Surgical History:   Procedure Laterality Date    HX GYN      D & C    HX ORTHOPAEDIC      Carpal tunnel release right wrist        SH: Reviewed with patient  Social History   Substance Use Topics    Smoking status: Never Smoker    Smokeless tobacco: Never Used    Alcohol use No       FH: Reviewed with patient  Family History   Problem Relation Age of Onset    Diabetes Mother     Heart Disease Father     Hypertension Father     Heart Disease Brother     Cancer Sister      breast    Cancer Brother      lung    Cancer Brother      lung    Diabetes Brother     Diabetes Brother     Other Brother      spinal meningitis    Diabetes Sister      leg amputation       Current medical providers:    Patient Care Team:  Jamie Dennis MD as PCP - Fadumo Foreman RN as Ambulatory Care Navigator  Dileep Munson MD (Ophthalmology)  Miladis Loyola MD (Cardiology)  William Mcqueen MD (Orthopedic Surgery)    Plan:      Orders Placed This Encounter    DEXA BONE DENSITY STUDY AXIAL    DUPLEX CAROTID BILATERAL     1. Patient will schedule and maintain health maintenance screens as discussed this visit  2. Patient will continue current exercise program; increase gradually as tolerated  3. Patient will complete ADV document and supply to office for scanning into chart. Provided Right to Decide booklet and blank AMD document. Health Maintenance   Topic Date Due    DTaP/Tdap/Td series (1 - Tdap) [birth year]/1956    ZOSTER VACCINE AGE 60>  09/20/1995    OSTEOPOROSIS SCREENING (DEXA)  11/20/2000    FOOT EXAM Q1  10/19/2016    MICROALBUMIN Q1  04/19/2017    EYE EXAM RETINAL OR DILATED Q1  06/16/2017    INFLUENZA AGE 9 TO ADULT  08/01/2017    HEMOGLOBIN A1C Q6M  08/28/2017    LIPID PANEL Q1  02/28/2018    GLAUCOMA SCREENING Q2Y  06/16/2018    MEDICARE YEARLY EXAM  08/29/2018    COLONOSCOPY  02/28/2027    Pneumococcal 65+ Low/Medium Risk  Completed         Urinary/ Fecal Incontinence: Patient reports occasional problem if unable to get to bathroom quick enough. Wears pad if planning on being away from home for extended period of time    Regular physical exercise: Yes patient gardens and works in the yard every chance she gets.     Medication reconciliation completed by MA and reviewed by provider. Family history and Care Team reviewed and updated. Patient provided with a copy of After Visit Summary and Medicare Part B Preventive Services Table. See table for findings under Recommendation and Scheduled.     Patient Verbalized understanding of all information discussed    PCP note    Reviewed and agree with above    Joseph Nielsen MD

## 2017-09-07 ENCOUNTER — TELEPHONE (OUTPATIENT)
Dept: INTERNAL MEDICINE CLINIC | Age: 82
End: 2017-09-07

## 2017-09-07 NOTE — TELEPHONE ENCOUNTER
Spoke to Chidi Tony at [facility] and asked that most recent ov note be faxed to [phone] for Dr. Lorena Kirkland to review. Chidi Tony agreed and stated will fax now.

## 2017-09-11 ENCOUNTER — HOSPITAL ENCOUNTER (OUTPATIENT)
Dept: VASCULAR SURGERY | Age: 82
Discharge: HOME | End: 2017-09-11
Attending: INTERNAL MEDICINE
Payer: MEDICARE

## 2017-09-11 DIAGNOSIS — I65.23 BILATERAL CAROTID ARTERY STENOSIS: ICD-10-CM

## 2017-09-11 PROCEDURE — 93880 EXTRACRANIAL BILAT STUDY: CPT

## 2017-09-13 RX ORDER — HYDRALAZINE HYDROCHLORIDE 25 MG/1
TABLET, FILM COATED ORAL
Qty: 90 TAB | Refills: 11 | Status: SHIPPED | OUTPATIENT
Start: 2017-09-13 | End: 2018-03-08 | Stop reason: SDUPTHER

## 2017-09-19 ENCOUNTER — TELEPHONE (OUTPATIENT)
Dept: INTERNAL MEDICINE CLINIC | Age: 82
End: 2017-09-19

## 2017-09-19 ENCOUNTER — HOSPITAL ENCOUNTER (EMERGENCY)
Age: 82
Discharge: HOME | End: 2017-09-19
Attending: FAMILY MEDICINE

## 2017-09-19 ENCOUNTER — APPOINTMENT (OUTPATIENT)
Dept: GENERAL RADIOLOGY | Age: 82
End: 2017-09-19
Attending: FAMILY MEDICINE

## 2017-09-19 VITALS
TEMPERATURE: 97.6 F | HEART RATE: 70 BPM | DIASTOLIC BLOOD PRESSURE: 68 MMHG | SYSTOLIC BLOOD PRESSURE: 147 MMHG | BODY MASS INDEX: 23.05 KG/M2 | HEIGHT: 64 IN | WEIGHT: 135 LBS | RESPIRATION RATE: 15 BRPM | OXYGEN SATURATION: 98 %

## 2017-09-19 DIAGNOSIS — M54.2 NECK PAIN: Primary | ICD-10-CM

## 2017-09-19 DIAGNOSIS — R59.1 LYMPHADENOPATHY: ICD-10-CM

## 2017-09-19 RX ORDER — METHOCARBAMOL 500 MG/1
500 TABLET, FILM COATED ORAL EVERY 12 HOURS
Qty: 20 TAB | Refills: 0 | Status: SHIPPED | OUTPATIENT
Start: 2017-09-19 | End: 2020-11-16 | Stop reason: SDUPTHER

## 2017-09-19 NOTE — UC PROVIDER NOTE
Patient is a 80 y.o. female presenting with neck pain. The history is provided by the patient. Neck Pain    This is a new problem. Episode onset: 5 days ago. The problem occurs constantly. The problem has been gradually worsening. The pain is associated with nothing. There has been no fever. The pain is present in the left side (anterior and posterior). The quality of the pain is described as aching. The pain does not radiate. The pain is moderate. The symptoms are aggravated by twisting. The pain is worse with movement. Pertinent negatives include no photophobia, no visual change, no chest pain, no syncope, no numbness, no headaches, no paresis, no tingling and no weakness. She has tried NSAIDs for the symptoms. The treatment provided no relief. Past Medical History:   Diagnosis Date    Arrhythmia     Arthritis     Bilateral cataracts     Chest pain     Diabetes ([address]     Essential hypertension     Hypercholesteremia     Hypertension         Past Surgical History:   Procedure Laterality Date    HX GYN      D & C    HX ORTHOPAEDIC      Carpal tunnel release right wrist         Family History   Problem Relation Age of Onset    Diabetes Mother     Heart Disease Father     Hypertension Father     Heart Disease Brother     Cancer Sister      breast    Cancer Brother      lung    Cancer Brother      lung    Diabetes Brother     Diabetes Brother     Other Brother      spinal meningitis    Diabetes Sister      leg amputation        Social History     Social History    Marital status:      Spouse name: N/A    Number of children: N/A    Years of education: N/A     Occupational History    Not on file.      Social History Main Topics    Smoking status: Never Smoker    Smokeless tobacco: Never Used    Alcohol use No    Drug use: Yes     Special: Prescription, OTC    Sexual activity: No     Other Topics Concern    Not on file     Social History Narrative                ALLERGIES: Review of patient's allergies indicates no known allergies. Review of Systems   Constitutional: Negative for chills and fever. HENT: Positive for congestion. Negative for sinus pain and sinus pressure. Eyes: Negative for photophobia. Respiratory: Negative for cough, shortness of breath and wheezing. Cardiovascular: Negative for chest pain and syncope. Gastrointestinal: Negative for abdominal pain, nausea and vomiting. Musculoskeletal: Positive for neck pain. Skin: Negative for rash. Neurological: Negative for tingling, weakness, numbness and headaches. Vitals:    09/19/17 1241   BP: 147/68   Pulse: 70   Resp: 15   Temp: 97.6 °F (36.4 °C)   SpO2: 98%   Weight: 61.2 kg (135 lb)   Height: 5' 4\" (1.626 m)       Physical Exam   Constitutional: She appears well-developed. No distress. Neck: Spinous process tenderness and muscular tenderness (left paraspinal) present. Decreased range of motion (unable to rotate bilaterally) present. No edema and no erythema present. Cardiovascular: Normal rate, regular rhythm and normal heart sounds. Pulmonary/Chest: Effort normal and breath sounds normal. No respiratory distress. She has no wheezes. She has no rales. Lymphadenopathy:        Head (left side): Submental and submandibular adenopathy present. She has cervical adenopathy. Left cervical: Superficial cervical adenopathy present. Neurological: She is alert. Skin: She is not diaphoretic. Psychiatric: She has a normal mood and affect. Her behavior is normal. Judgment and thought content normal.   Nursing note and vitals reviewed. MDM     Differential Diagnosis; Clinical Impression; Plan:     CLINICAL IMPRESSION:  Neck pain  (primary encounter diagnosis)  Lymphadenopathy    Plan:  1. Continue Advil prn  2. Robaxin as needed  3. Heat/exercises  4.  F/u with pcp  Amount and/or Complexity of Data Reviewed:   Tests in the radiology section of CPT®:  Ordered and reviewed  Risk of Significant Complications, Morbidity, and/or Mortality:   Presenting problems: Moderate  Diagnostic procedures: Moderate  Management options: Moderate  Progress:   Patient progress:  Stable      Procedures    Study Result      EXAM:  XR SPINE CERV PA LAT ODONT 3 V MAX     INDICATION:  neck pain x2 days. Neck pain that radiates down her neck  bilaterally since last Thursday. No trauma or injury.      COMPARISON: None.     FINDINGS: AP, lateral, and open mouth odontoid views of the cervical spine were  obtained. The alignment is intact with mild segmentation anomaly of the C4-5  level where there is decreased AP dimension of the vertebral bodies. The  vertebral body heights are maintained. There is loss of vertical disc height at  the C4-5 and C5-6 levels with mild marginal osteophyte formation. The posterior  elements appear intact and aligned. There is no fracture or subluxation. The  prevertebral soft tissues are normal.  The odontoid process is intact and the  C1-C2 relationship is normal.      IMPRESSION  IMPRESSION: Mild C4-5 segment 8 and a normally with mild degenerative disc  disease at the C4-5 and C5-6 discs.  No acute findings.

## 2017-09-19 NOTE — TELEPHONE ENCOUNTER
#[phone] pt states she must hear from someone as soon as possible. Can she be seen today? Pt states she is in severe pain.

## 2017-09-19 NOTE — TELEPHONE ENCOUNTER
#[phone] very sharp pain in neck, like a stabbing pain and sometime it throbs. When she lays down it really hurts then hard to lift head up. Pt is requesting something for pain to be called into [facility] on [address]. Please call pt with any questions. Ice and OTC not working.

## 2017-09-19 NOTE — TELEPHONE ENCOUNTER
Left message advising patient that a RX had katie sent into the pharmacy for the patient that she should call and schedule an appt with any provider in the office this week. Also advised to call the office with questions.

## 2017-10-19 RX ORDER — TEMAZEPAM 15 MG/1
CAPSULE ORAL
Qty: 30 CAP | Refills: 5 | Status: SHIPPED | OUTPATIENT
Start: 2017-10-19 | End: 2018-03-15 | Stop reason: SDUPTHER

## 2017-12-14 RX ORDER — ATORVASTATIN CALCIUM 20 MG/1
TABLET, FILM COATED ORAL
Qty: 30 TAB | Refills: 11 | Status: SHIPPED | OUTPATIENT
Start: 2017-12-14 | End: 2019-02-01 | Stop reason: SDUPTHER

## 2017-12-26 RX ORDER — METFORMIN HYDROCHLORIDE 500 MG/1
TABLET ORAL
Qty: 90 TAB | Refills: 6 | Status: SHIPPED | OUTPATIENT
Start: 2017-12-26 | End: 2018-03-20 | Stop reason: SDUPTHER

## 2018-03-08 ENCOUNTER — OFFICE VISIT (OUTPATIENT)
Dept: INTERNAL MEDICINE CLINIC | Age: 83
End: 2018-03-08

## 2018-03-08 VITALS
SYSTOLIC BLOOD PRESSURE: 130 MMHG | TEMPERATURE: 98 F | DIASTOLIC BLOOD PRESSURE: 61 MMHG | OXYGEN SATURATION: 100 % | WEIGHT: 134 LBS | HEIGHT: 64 IN | HEART RATE: 66 BPM | BODY MASS INDEX: 22.88 KG/M2

## 2018-03-08 DIAGNOSIS — E78.00 PURE HYPERCHOLESTEROLEMIA: ICD-10-CM

## 2018-03-08 DIAGNOSIS — I10 ESSENTIAL HYPERTENSION: Primary | ICD-10-CM

## 2018-03-08 DIAGNOSIS — M85.80 OSTEOPENIA, UNSPECIFIED LOCATION: ICD-10-CM

## 2018-03-08 DIAGNOSIS — F51.01 PRIMARY INSOMNIA: ICD-10-CM

## 2018-03-08 DIAGNOSIS — E11.9 TYPE 2 DIABETES MELLITUS WITHOUT COMPLICATION, WITHOUT LONG-TERM CURRENT USE OF INSULIN (HCC): Primary | ICD-10-CM

## 2018-03-08 DIAGNOSIS — I10 ESSENTIAL HYPERTENSION, BENIGN: ICD-10-CM

## 2018-03-08 PROBLEM — M85.88 OSTEOPENIA OF SPINE: Status: ACTIVE | Noted: 2018-03-08

## 2018-03-08 RX ORDER — IBUPROFEN 400 MG/1
400 TABLET ORAL
Qty: 30 TAB | Refills: 5 | Status: SHIPPED | OUTPATIENT
Start: 2018-03-08 | End: 2020-11-09 | Stop reason: SDUPTHER

## 2018-03-08 RX ORDER — LISINOPRIL AND HYDROCHLOROTHIAZIDE 12.5; 2 MG/1; MG/1
1 TABLET ORAL 2 TIMES DAILY
Qty: 180 TAB | Refills: 3 | Status: SHIPPED | OUTPATIENT
Start: 2018-03-08 | End: 2019-04-13 | Stop reason: SDUPTHER

## 2018-03-08 NOTE — MR AVS SNAPSHOT
[address] [facility] 
[phone] Patient: Valeri Schirmer MRN:  [id number] Visit Information Date & Time Provider Department Dept. Phone Encounter #  
 3/8/2018  8:45 AM Neela Perez, [address] [place] [address],[address] [phone] [id number] Follow-up Instructions Return in about 6 months (around 9/8/2018) for dm-2 htn hld. Upcoming Health Maintenance Date Due DTaP/Tdap/Td series (1 - Tdap) 11/20/1956 ZOSTER VACCINE AGE 60> 9/20/1995 Bone Densitometry (Dexa) Screening 11/20/2000 FOOT EXAM Q1 10/19/2016 MICROALBUMIN Q1 4/19/2017 EYE EXAM RETINAL OR DILATED Q1 6/16/2017 HEMOGLOBIN A1C Q6M 8/28/2017 GLAUCOMA SCREENING Q2Y 6/16/2018 LIPID PANEL Q1 8/22/2018 COLONOSCOPY 2/28/2027 Allergies as of 3/8/2018  Review Complete On: 3/8/2018 By: Darinel Ponce LPN No Known Allergies Current Immunizations  Reviewed on 11/23/2010 Name Date H1N1 FLU VACCINE 1/12/2010 Influenza Vaccine 10/5/2015 Influenza Vaccine Split 11/23/2010 Pneumococcal Conjugate (PCV-13) 4/2/2015 Pneumococcal Polysaccharide (PPSV-23) 1/17/2013 Not reviewed this visit You Were Diagnosed With   
  
 Codes Comments Type 2 diabetes mellitus without complication, without long-term current use of insulin (HCC)    -  Primary ICD-10-CM: E11.9 ICD-9-CM: 250.00 Essential hypertension, benign     ICD-10-CM: I10 
ICD-9-CM: 401.1 Pure hypercholesterolemia     ICD-10-CM: E78.00 ICD-9-CM: 272.0 Osteopenia, unspecified location     ICD-10-CM: M85.80 ICD-9-CM: 733.90 Vitals BP Pulse Temp Height(growth percentile) Weight(growth percentile) SpO2  
 130/61 (BP 1 Location: Left arm, BP Patient Position: Sitting) 66 98 °F (36.7 °C) (Oral) 5' 4\" (1.626 m) 134 lb (60.8 kg) 100% BMI OB Status Smoking Status 23 kg/m2 Postmenopausal Never Smoker BMI and BSA Data Body Mass Index Body Surface Area  
 23 kg/m 2 1.66 m 2 Preferred Pharmacy Pharmacy Name Phone [address] [place] [address] [phone] Your Updated Medication List  
  
   
This list is accurate as of 3/8/18  9:59 AM.  Always use your most recent med list. amLODIPine 10 mg tablet Commonly known as:  Richard Lords TAKE ONE TABLET BY MOUTH ONCE DAILY  
  
 aspirin 81 mg tablet Take  by mouth daily. atorvastatin 20 mg tablet Commonly known as:  LIPITOR  
TAKE ONE TABLET BY MOUTH EVERY OTHER DAY  
  
 glucose blood VI test strips strip Commonly known as:  ASCENSIA AUTODISC VI, ONE TOUCH ULTRA TEST VI  
Use to check blood sugars once daily E11.9  
  
 hydrALAZINE 10 mg tablet Commonly known as:  APRESOLINE  
TAKE TWO AND ONE-HALF TABLETS BY MOUTH THREE TIMES DAILY Lancets Misc Use to check blood sugars once daily E11.9  
  
 lisinopril-hydroCHLOROthiazide 20-12.5 mg per tablet Commonly known as:  PRINZIDE, ZESTORETIC  
TAKE TWO TABLETS BY MOUTH ONCE DAILY  
  
 metFORMIN 500 mg tablet Commonly known as:  GLUCOPHAGE  
TAKE 1 TABLET BY MOUTH IN THE MORNING AND TAKE TWO TABLETS BY MOUTH IN THE EVENING  
  
 methocarbamol 500 mg tablet Commonly known as:  ROBAXIN Take 1 Tab by mouth every twelve (12) hours. metoprolol succinate 100 mg tablet Commonly known as:  TOPROL-XL  
TAKE ONE TABLET BY MOUTH ONCE DAILY [place]-3-DHA-EPA-Fish Oil 1,000 mg (120 mg-180 mg) Cap Take  by mouth. temazepam 15 mg capsule Commonly known as:  RESTORIL  
TAKE ONE CAPSULE BY MOUTH AT BEDTIME AS NEEDED FOR SLEEP  
  
 VITAMIN D3 1,000 unit Cap Generic drug:  cholecalciferol Take  by mouth. We Performed the Following HEMOGLOBIN A1C WITH EAG [12459 CPT(R)] METABOLIC PANEL, COMPREHENSIVE [01554 CPT(R)] Follow-up Instructions Return in about 6 months (around 9/8/2018) for dm-2 htn hld. To-Do List   
 03/19/2018 Imaging:  DEXA BONE DENSITY STUDY AXIAL Introducing [address]! [facility] introduces [employer] patient portal. Now you can access parts of your medical record, email your doctor's office, and request medication refills online. 1. In your internet browser, go to [email or web address]. [email or web address] 2. Click on the First Time User? Click Here link in the Sign In box. You will see the New Member Sign Up page. 3. Enter your [employer] Access Code exactly as it appears below. You will not need to use this code after youve completed the sign-up process. If you do not sign up before the expiration date, you must request a new code. · [employer] Access Code: [email or web address] Expires: 6/6/2018  9:57 AM 
 
4. Enter the last four digits of your Social Security Number (xxxx) and Date of Birth (mm/dd/yyyy) as indicated and click Submit. You will be taken to the next sign-up page. 5. Create a [employer] ID. This will be your [employer] login ID and cannot be changed, so think of one that is secure and easy to remember. 6. Create a [employer] password. You can change your password at any time. 7. Enter your Password Reset Question and Answer. This can be used at a later time if you forget your password. 8. Enter your e-mail address. You will receive e-mail notification when new information is available in [address] 9. Click Sign Up. You can now view and download portions of your medical record. 10. Click the Download Summary menu link to download a portable copy of your medical information. If you have questions, please visit the Frequently Asked Questions section of the [employer] website. Remember, [employer] is NOT to be used for urgent needs. For medical emergencies, dial 911. Now available from your iPhone and Android! Please provide this summary of care documentation to your next provider. Your primary care clinician is listed as Viet GALEANO. If you have any questions after today's visit, please call [phone].

## 2018-03-08 NOTE — PROGRESS NOTES
HISTORY OF PRESENT ILLNESS  Claudia Coto is a 80 y.o. female. HPI        F/u DM-2 HTN  HLD  Goes to [facility] q6 mos with labs--last LDL 77  fsbs 80-90's, highest 102  Carotid dopplers last visit-<50% b/l  C/o neck pain and wrist pain    Hx L spine osteopnie 2013 at PCP office, not taking calcium or vit D  Last visit:  Went to [facility] lat week and had labs--a1c, lipids, urine checked per pt  C/o left hand numbness left hand fingerall 5 for years. sxs occur at night  S/p right carpal release years Dr Douglass Heads. fsbs 107-110  in morning per patient, lowest was 80's  Goes to [facility] clinic q 6 months  No cp or sob sxs  Had some fuuny sensation like gas in chest yesterday and checked pulse which was slipping beats  Had mobile mammogram from [facility] recently which was wnl per pt    Patient Active Problem List    Diagnosis Date Noted    Bilateral carotid artery stenosis 05/16/2016    Chest pain, unspecified 07/17/2012    Insomnia 01/12/2010    Family history of breast cancer in first degree relative 01/12/2010    Type 2 diabetes mellitus without complication ([facility] [address] 69/39/1341    Essential hypertension, benign 06/29/2009    Pure hypercholesterolemia 06/29/2009     Current Outpatient Prescriptions   Medication Sig Dispense Refill    metFORMIN (GLUCOPHAGE) 500 mg tablet TAKE 1 TABLET BY MOUTH IN THE MORNING AND TAKE TWO TABLETS BY MOUTH IN THE EVENING 90 Tab 6    atorvastatin (LIPITOR) 20 mg tablet TAKE ONE TABLET BY MOUTH EVERY OTHER DAY 30 Tab 11    temazepam (RESTORIL) 15 mg capsule TAKE ONE CAPSULE BY MOUTH AT BEDTIME AS NEEDED FOR SLEEP 30 Cap 5    methocarbamol (ROBAXIN) 500 mg tablet Take 1 Tab by mouth every twelve (12) hours.  20 Tab 0    hydrALAZINE (APRESOLINE) 25 mg tablet TAKE ONE TABLET BY MOUTH THREE TIMES DAILY 90 Tab 11    Lancets misc Use to check blood sugars once daily E11.9 100 Each 11    glucose blood VI test strips (ASCENSIA AUTODISC VI, ONE TOUCH ULTRA TEST VI) strip Use to check blood sugars once daily E11.9 100 Strip 11    metoprolol succinate (TOPROL-XL) 100 mg tablet TAKE ONE TABLET BY MOUTH ONCE DAILY 30 Tab 11    lisinopril-hydroCHLOROthiazide (PRINZIDE, ZESTORETIC) 20-12.5 mg per tablet TAKE TWO TABLETS BY MOUTH ONCE DAILY 60 Tab 11    amLODIPine (NORVASC) 10 mg tablet TAKE ONE TABLET BY MOUTH ONCE DAILY 90 Tab 3    hydrALAZINE (APRESOLINE) 10 mg tablet TAKE TWO AND ONE-HALF TABLETS BY MOUTH THREE TIMES DAILY 225 Tab 1    Cholecalciferol, Vitamin D3, (VITAMIN D3) 1,000 unit Cap Take  by mouth.  Omega-3-DHA-EPA-Fish Oil 1,000 (120-180) mg Cap Take  by mouth.  aspirin 81 mg Tab Take  by mouth daily. No Known Allergies   Lab Results  Component Value Date/Time   Hemoglobin A1c 6.8 (H) 02/28/2017 09:58 AM   Hemoglobin A1c 6.9 (H) 08/16/2016 09:21 AM   Hemoglobin A1c 7.0 (H) 07/06/2012 09:15 AM   Hemoglobin A1c, External 7.0 on 10/02/2014 Dr. Lena Douglass 10/02/2014   Hemoglobin A1c, External 6.6 on 10/2/14 Dr. Lena Douglass 10/02/2014   Glucose 106 (H) 02/28/2017 09:58 AM   Glucose (POC) 113 (H) 07/25/2012 01:49 PM   Microalb/Creat ratio (ug/mg creat.) <5.6 04/19/2016 09:12 AM   LDL, calculated 71 02/28/2017 09:58 AM   Creatinine 0.71 02/28/2017 09:58 AM      Lab Results  Component Value Date/Time   Cholesterol, total 153 02/28/2017 09:58 AM   Cholesterol (POC) 160 01/28/2011 08:38 AM   HDL Cholesterol 69 02/28/2017 09:58 AM   LDL, calculated 71 02/28/2017 09:58 AM   LDL Cholesterol (POC) 45 01/28/2011 08:38 AM   LDL-C, External 71 on 10/02/14 Dr. Lena Douglass 10/02/2014   Triglyceride 64 02/28/2017 09:58 AM   Triglycerides (POC) 281 01/28/2011 08:38 AM   CHOL/HDL Ratio 2.2 07/30/2010 09:21 AM     Lab Results  Component Value Date/Time   ALT (SGPT) 13 02/28/2017 09:58 AM   AST (SGOT) 21 02/28/2017 09:58 AM   AST (POC) 22 01/28/2011 08:40 AM   Alk.  phosphatase 92 02/28/2017 09:58 AM   Bilirubin, total 0.2 02/28/2017 09:58 AM   Albumin 4.3 02/28/2017 09:58 AM   Protein, total 6.9 02/28/2017 09:58 AM   INR 1.0 07/19/2012 03:50 AM   Prothrombin time 10.6 07/19/2012 03:50 AM   PLATELET 682 76/04/8087 09:58 AM       Lab Results  Component Value Date/Time   GFR est non-AA 80 02/28/2017 09:58 AM   GFR est AA 92 02/28/2017 09:58 AM   Creatinine 0.71 02/28/2017 09:58 AM   BUN 12 02/28/2017 09:58 AM   Sodium 135 02/28/2017 09:58 AM   Potassium 4.0 02/28/2017 09:58 AM   Chloride 94 (L) 02/28/2017 09:58 AM   CO2 24 02/28/2017 09:58 AM        ROS    Physical Exam   Constitutional: She appears well-developed and well-nourished. Appears stated age   Cardiovascular: Normal rate, regular rhythm and normal heart sounds. Exam reveals no gallop and no friction rub. No murmur heard. Pulmonary/Chest: Effort normal and breath sounds normal. No respiratory distress. She has no wheezes. Abdominal: Soft. Bowel sounds are normal.   Musculoskeletal: She exhibits no edema. Neurological: She is alert. Psychiatric: She has a normal mood and affect. Nursing note and vitals reviewed. ASSESSMENT and PLAN  Diagnoses and all orders for this visit:    1. Type 2 diabetes mellitus without complication, without long-term current use of insulin (HCC)  -     HEMOGLOBIN A1C WITH EAG  -     METABOLIC PANEL, COMPREHENSIVE   Tight control   Lower dose to 500mg bid and if needed to 500mg qd  2. Essential hypertension, benign  -     METABOLIC PANEL, COMPREHENSIVE   controlled  3. Pure hypercholesterolemia  -     METABOLIC PANEL, COMPREHENSIVE   Continue statin  4. Osteopenia, unspecified location  -     DEXA BONE DENSITY STUDY AXIAL; Future    5. Primary insomnia   Will refill restoril when needed  Other orders  -     ibuprofen (MOTRIN) 400 mg tablet; Take 1 Tab by mouth every eight (8) hours as needed for Pain. Follow-up Disposition:  Return in about 6 months (around 9/8/2018) for dm-2 htn hld.

## 2018-03-09 LAB
ALBUMIN SERPL-MCNC: 4.2 G/DL (ref 3.5–4.7)
ALBUMIN/GLOB SERPL: 1.6 {RATIO} (ref 1.2–2.2)
ALP SERPL-CCNC: 102 IU/L (ref 39–117)
ALT SERPL-CCNC: 16 IU/L (ref 0–32)
AST SERPL-CCNC: 22 IU/L (ref 0–40)
BILIRUB SERPL-MCNC: 0.2 MG/DL (ref 0–1.2)
BUN SERPL-MCNC: 11 MG/DL (ref 8–27)
BUN/CREAT SERPL: 15 (ref 12–28)
CALCIUM SERPL-MCNC: 9.7 MG/DL (ref 8.7–10.3)
CHLORIDE SERPL-SCNC: 96 MMOL/L (ref 96–106)
CO2 SERPL-SCNC: 26 MMOL/L (ref 18–29)
CREAT SERPL-MCNC: 0.71 MG/DL (ref 0.57–1)
EST. AVERAGE GLUCOSE BLD GHB EST-MCNC: 143 MG/DL
GFR SERPLBLD CREATININE-BSD FMLA CKD-EPI: 80 ML/MIN/1.73
GFR SERPLBLD CREATININE-BSD FMLA CKD-EPI: 92 ML/MIN/1.73
GLOBULIN SER CALC-MCNC: 2.7 G/DL (ref 1.5–4.5)
GLUCOSE SERPL-MCNC: 130 MG/DL (ref 65–99)
HBA1C MFR BLD: 6.6 % (ref 4.8–5.6)
POTASSIUM SERPL-SCNC: 4.1 MMOL/L (ref 3.5–5.2)
PROT SERPL-MCNC: 6.9 G/DL (ref 6–8.5)
SODIUM SERPL-SCNC: 137 MMOL/L (ref 134–144)

## 2018-03-19 DIAGNOSIS — F51.01 PRIMARY INSOMNIA: ICD-10-CM

## 2018-03-20 RX ORDER — METFORMIN HYDROCHLORIDE 500 MG/1
TABLET ORAL
Qty: 270 TAB | Refills: 3 | Status: SHIPPED | OUTPATIENT
Start: 2018-03-20 | End: 2019-04-13 | Stop reason: SDUPTHER

## 2018-03-22 ENCOUNTER — HOSPITAL ENCOUNTER (OUTPATIENT)
Dept: MAMMOGRAPHY | Age: 83
Discharge: HOME | End: 2018-03-22
Attending: INTERNAL MEDICINE
Payer: MEDICARE

## 2018-03-22 DIAGNOSIS — M85.80 OSTEOPENIA, UNSPECIFIED LOCATION: ICD-10-CM

## 2018-03-22 PROCEDURE — 77080 DXA BONE DENSITY AXIAL: CPT

## 2018-06-05 RX ORDER — AMLODIPINE BESYLATE 10 MG/1
TABLET ORAL
Qty: 90 TAB | Refills: 3 | Status: SHIPPED | OUTPATIENT
Start: 2018-06-05 | End: 2019-06-13 | Stop reason: SDUPTHER

## 2018-07-17 RX ORDER — METOPROLOL SUCCINATE 100 MG/1
TABLET, EXTENDED RELEASE ORAL
Qty: 30 TAB | Refills: 11 | Status: SHIPPED | OUTPATIENT
Start: 2018-07-17 | End: 2019-07-24 | Stop reason: SDUPTHER

## 2018-08-27 DIAGNOSIS — E11.9 TYPE 2 DIABETES MELLITUS WITHOUT COMPLICATION, WITHOUT LONG-TERM CURRENT USE OF INSULIN (HCC): ICD-10-CM

## 2018-08-27 RX ORDER — LANCETS 28 GAUGE
EACH MISCELLANEOUS
Qty: 100 LANCET | Refills: 11 | Status: SHIPPED | OUTPATIENT
Start: 2018-08-27 | End: 2019-09-27 | Stop reason: SDUPTHER

## 2018-08-27 RX ORDER — BLOOD-GLUCOSE METER
KIT MISCELLANEOUS
Qty: 100 STRIP | Refills: 11 | Status: SHIPPED | OUTPATIENT
Start: 2018-08-27 | End: 2019-09-27 | Stop reason: SDUPTHER

## 2018-09-28 RX ORDER — HYDRALAZINE HYDROCHLORIDE 25 MG/1
TABLET, FILM COATED ORAL
Qty: 90 TAB | Refills: 11 | Status: SHIPPED | OUTPATIENT
Start: 2018-09-28 | End: 2019-09-27 | Stop reason: SDUPTHER

## 2018-09-29 DIAGNOSIS — F51.01 PRIMARY INSOMNIA: ICD-10-CM

## 2018-09-29 RX ORDER — TEMAZEPAM 15 MG/1
CAPSULE ORAL
Qty: 30 CAP | Refills: 5 | Status: SHIPPED | OUTPATIENT
Start: 2018-09-29 | End: 2019-03-27 | Stop reason: SDUPTHER

## 2018-10-01 ENCOUNTER — TELEPHONE (OUTPATIENT)
Dept: INTERNAL MEDICINE CLINIC | Age: 83
End: 2018-10-01

## 2018-10-22 NOTE — PROGRESS NOTES
HISTORY OF PRESENT ILLNESS  Pratik Kirby is a 80 y.o. female. HPI        F/u DM-2 HTN  HLD, mild Carotid artery stenosis, chronic insomnia and medicare wellness-----------------  Last a1c 6.6 LDL 71  Takes temazepam nightly which works for sleep  fsbs avg 110 qam  Goes to [facility] next month  No cp sob neuropathty      Last OV  Goes to [facility] q6 mos with labs--last LDL 77  fsbs 80-90's, highest 102  Carotid dopplers last visit-<50% b/l  C/o neck pain and wrist pain     Hx L spine osteopnie 2013 at PCP office, not taking calcium or vit D  Last visit:  Went to [facility] lat week and had labs--a1c, lipids, urine checked per pt  C/o left hand numbness left hand fingerall 5 for years. sxs occur at night  S/p right carpal release years Dr Chaim Dale.    fsbs 107-110  in morning per patient, lowest was 90's  Goes to [facility] clinic q 6 months  No cp or sob sxs  Had some fuuny sensation like gas in chest yesterday and checked pulse which was slipping beats  Had mobile mammogram from [facility] recently which was wnl per pt           Patient Active Problem List    Diagnosis Date Noted    Osteopenia of spine 03/08/2018    Bilateral carotid artery stenosis 05/16/2016    Chest pain, unspecified 07/17/2012    Insomnia 01/12/2010    Family history of breast cancer in first degree relative 01/12/2010    Type [facility] diabetes mellitus without complication ([facility] 51/16/0866    Essential hypertension, benign 06/29/2009    Pure hypercholesterolemia 06/29/2009     Current Outpatient Medications   Medication Sig Dispense Refill    temazepam (RESTORIL) 15 mg capsule TAKE 1 CAPSULE BY MOUTH AT BEDTIME AS NEEDED FOR SLEEP 30 Cap 5    hydrALAZINE (APRESOLINE) 25 mg tablet TAKE ONE TABLET BY MOUTH THREE TIMES DAILY 90 Tab 11    FREESTYLE LITE STRIPS strip USE ONE STRIP TO CHECK GLUCOSE ONCE DAILY 100 Strip 11    FREESTYLE LANCETS 28 gauge misc USE ONE  TO CHECK GLUCOSE ONCE DAILY 100 Lancet 11    metoprolol succinate (TOPROL-XL) 100 mg tablet TAKE ONE TABLET BY MOUTH ONCE DAILY 30 Tab 11    amLODIPine (NORVASC) 10 mg tablet TAKE ONE TABLET BY MOUTH ONCE DAILY 90 Tab 3    metFORMIN (GLUCOPHAGE) 500 mg tablet TAKE 1 TABLET BY MOUTH IN THE MORNING AND TAKE TWO TABLETS BY MOUTH IN THE EVENING 270 Tab 3    ibuprofen (MOTRIN) 400 mg tablet Take 1 Tab by mouth every eight (8) hours as needed for Pain. 30 Tab 5    lisinopril-hydroCHLOROthiazide (PRINZIDE, ZESTORETIC) 20-12.5 mg per tablet Take 1 Tab by mouth two (2) times a day. 180 Tab 3    atorvastatin (LIPITOR) 20 mg tablet TAKE ONE TABLET BY MOUTH EVERY OTHER DAY 30 Tab 11    methocarbamol (ROBAXIN) 500 mg tablet Take 1 Tab by mouth every twelve (12) hours. 20 Tab 0    hydrALAZINE (APRESOLINE) 10 mg tablet TAKE TWO AND ONE-HALF TABLETS BY MOUTH THREE TIMES DAILY 225 Tab 1    Cholecalciferol, Vitamin D3, (VITAMIN D3) 1,000 unit Cap Take  by mouth.  Omega-3-DHA-EPA-Fish Oil 1,000 (120-180) mg Cap Take  by mouth.  aspirin 81 mg Tab Take  by mouth daily.        No Known Allergies  Social History     Tobacco Use    Smoking status: Never Smoker    Smokeless tobacco: Never Used   Substance Use Topics    Alcohol use: No      Lab Results   Component Value Date/Time    WBC 4.7 02/28/2017 09:58 AM    HGB 11.6 02/28/2017 09:58 AM    HCT 35.1 02/28/2017 09:58 AM    PLATELET 275 87/86/9041 09:58 AM    MCV 86 02/28/2017 09:58 AM     Lab Results   Component Value Date/Time    Hemoglobin A1c 6.6 (H) 03/08/2018 10:15 AM    Hemoglobin A1c 6.8 (H) 02/28/2017 09:58 AM    Hemoglobin A1c 6.9 (H) 08/16/2016 09:21 AM    Hemoglobin A1c, External 7.0 on 10/02/2014 Dr. Jeff Hicks 10/02/2014    Hemoglobin A1c, External 6.6 on 10/2/14 Dr. Jeff Hicks 10/02/2014    Glucose 130 (H) 03/08/2018 10:15 AM    Glucose (POC) 113 (H) 07/25/2012 01:49 PM    Microalb/Creat ratio (ug/mg creat.) <5.6 04/19/2016 09:12 AM    LDL, calculated 71 02/28/2017 09:58 AM    Creatinine 0.71 03/08/2018 10:15 AM Lab Results   Component Value Date/Time    Cholesterol, total 153 02/28/2017 09:58 AM    Cholesterol (POC) 160 01/28/2011 08:38 AM    HDL Cholesterol 69 02/28/2017 09:58 AM    LDL, calculated 71 02/28/2017 09:58 AM    LDL Cholesterol (POC) 45 01/28/2011 08:38 AM    LDL-C, External 71 on 10/02/14 Dr. Grover Bodily 10/02/2014    Triglyceride 64 02/28/2017 09:58 AM    Triglycerides (POC) 281 01/28/2011 08:38 AM    CHOL/HDL Ratio 2.2 07/30/2010 09:21 AM     Lab Results   Component Value Date/Time    GFR est non-AA 80 03/08/2018 10:15 AM    GFR est AA 92 03/08/2018 10:15 AM    Creatinine 0.71 03/08/2018 10:15 AM    BUN 11 03/08/2018 10:15 AM    Sodium 137 03/08/2018 10:15 AM    Potassium 4.1 03/08/2018 10:15 AM    Chloride 96 03/08/2018 10:15 AM    CO2 26 03/08/2018 10:15 AM        ROS    Physical Exam   Constitutional: She appears well-developed and well-nourished. Appears stated age   Cardiovascular: Normal rate, regular rhythm and normal heart sounds. Exam reveals no gallop and no friction rub. No murmur heard. Pulmonary/Chest: Effort normal and breath sounds normal. No respiratory distress. She has no wheezes. Abdominal: Soft. Bowel sounds are normal.   Musculoskeletal: She exhibits no edema. Neurological: She is alert. Psychiatric: She has a normal mood and affect. Nursing note and vitals reviewed. ASSESSMENT and PLAN  Diagnoses and all orders for this visit:    1. Type 2 diabetes mellitus without complication, without long-term current use of insulin (HCC)  -     HEMOGLOBIN A1C WITH EAG  -     METABOLIC PANEL, COMPREHENSIVE  -     MICROALBUMIN, UR, RAND W/ MICROALB/CREAT RATIO    2. Essential hypertension  -     METABOLIC PANEL, COMPREHENSIVE  -     CBC W/O DIFF    3. Pure hypercholesterolemia  -     METABOLIC PANEL, COMPREHENSIVE  -     LIPID PANEL    4. Primary insomnia    5.  Encounter for immunization  -     INFLUENZA VACCINE INACTIVATED (IIV), SUBUNIT, ADJUVANTED, IM      Follow-up Disposition: Not on File   This is the Subsequent Medicare Annual Wellness Exam, performed 12 months or more after the Initial AWV or the last Subsequent AWV    I have reviewed the patient's medical history in detail and updated the computerized patient record. History     Past Medical History:   Diagnosis Date    Arrhythmia     Arthritis     Bilateral cataracts     Chest pain     Diabetes ([address]     Essential hypertension     Hypercholesteremia     Hypertension       Past Surgical History:   Procedure Laterality Date    HX GYN      D & C    HX ORTHOPAEDIC      Carpal tunnel release right wrist     Current Outpatient Medications   Medication Sig Dispense Refill    temazepam (RESTORIL) 15 mg capsule TAKE 1 CAPSULE BY MOUTH AT BEDTIME AS NEEDED FOR SLEEP 30 Cap 5    hydrALAZINE (APRESOLINE) 25 mg tablet TAKE ONE TABLET BY MOUTH THREE TIMES DAILY 90 Tab 11    FREESTYLE LITE STRIPS strip USE ONE STRIP TO CHECK GLUCOSE ONCE DAILY 100 Strip 11    FREESTYLE LANCETS 28 gauge misc USE ONE  TO CHECK GLUCOSE ONCE DAILY 100 Lancet 11    metoprolol succinate (TOPROL-XL) 100 mg tablet TAKE ONE TABLET BY MOUTH ONCE DAILY 30 Tab 11    amLODIPine (NORVASC) 10 mg tablet TAKE ONE TABLET BY MOUTH ONCE DAILY 90 Tab 3    metFORMIN (GLUCOPHAGE) 500 mg tablet TAKE 1 TABLET BY MOUTH IN THE MORNING AND TAKE TWO TABLETS BY MOUTH IN THE EVENING 270 Tab 3    lisinopril-hydroCHLOROthiazide (PRINZIDE, ZESTORETIC) 20-12.5 mg per tablet Take 1 Tab by mouth two (2) times a day. 180 Tab 3    atorvastatin (LIPITOR) 20 mg tablet TAKE ONE TABLET BY MOUTH EVERY OTHER DAY 30 Tab 11    Cholecalciferol, Vitamin D3, (VITAMIN D3) 1,000 unit Cap Take  by mouth.  Omega-3-DHA-EPA-Fish Oil 1,000 (120-180) mg Cap Take  by mouth.  aspirin 81 mg Tab Take  by mouth daily.  ibuprofen (MOTRIN) 400 mg tablet Take 1 Tab by mouth every eight (8) hours as needed for Pain.  30 Tab 5    methocarbamol (ROBAXIN) 500 mg tablet Take 1 Tab by mouth every twelve (12) hours. 21 Tab 0     No Known Allergies  Family History   Problem Relation Age of Onset    Diabetes Mother     Heart Disease Father     Hypertension Father     Heart Disease Brother     Cancer Sister         breast    Cancer Brother         lung    Cancer Brother         lung    Diabetes Brother     Diabetes Brother     Other Brother         spinal meningitis    Diabetes Sister         leg amputation     Social History     Tobacco Use    Smoking status: Never Smoker    Smokeless tobacco: Never Used   Substance Use Topics    Alcohol use: No     Patient Active Problem List   Diagnosis Code    Type 2 diabetes mellitus without complication (HCC) H02.4    Essential hypertension, benign I10    Pure hypercholesterolemia E78.00    Insomnia G47.00    Family history of breast cancer in first degree relative Z80.3    Chest pain, unspecified R07.9    Bilateral carotid artery stenosis I65.23    Osteopenia of spine M85.88       Depression Risk Factor Screening:     PHQ over the last two weeks 10/23/2018   Little interest or pleasure in doing things Not at all   Feeling down, depressed, irritable, or hopeless Not at all   Total Score PHQ 2 0     Alcohol Risk Factor Screening: You do not drink alcohol or very rarely. Functional Ability and Level of Safety:   Hearing Loss  Hearing is good. Activities of Daily Living  The home contains: handrails  Patient does total self care    Fall Risk  Fall Risk Assessment, last 12 mths 10/23/2018   Able to walk? Yes   Fall in past 12 months?  No       Abuse Screen  Patient is not abused    Cognitive Screening   Evaluation of Cognitive Function:  Has your family/caregiver stated any concerns about your memory: no  Normal    Patient Care Team   Patient Care Team:  Stephanie Henson MD as PCP - Fay Mensah, Alia Wallis, RN as Ambulatory Care Navigator  Charlie Carlos MD (Ophthalmology)  Marianna Gonzalez MD (Cardiology)  Ru Yan MD EMILY (Orthopedic Surgery)    Assessment/Plan   Education and counseling provided:  Are appropriate based on today's review and evaluation  End-of-Life planning (with patient's consent)-see acp note  Influenza Vaccine-fluad today  Screening Mammography-ordered  shingrix and tdap recommended    Diagnoses and all orders for this visit:    1. Type 2 diabetes mellitus without complication, without long-term current use of insulin (HCC)  -     HEMOGLOBIN A1C WITH EAG  -     METABOLIC PANEL, COMPREHENSIVE  -     MICROALBUMIN, UR, RAND W/ MICROALB/CREAT RATIO   Controlled per home readings  2. Essential hypertension  -     METABOLIC PANEL, COMPREHENSIVE  -     CBC W/O DIFF   controlled  3. Pure hypercholesterolemia  -     METABOLIC PANEL, COMPREHENSIVE  -     LIPID PANEL   Continue lipitor  4. Primary insomnia   Refill temazepam when needed  5. Encounter for immunization  -     INFLUENZA VACCINE INACTIVATED (IIV), SUBUNIT, ADJUVANTED, IM    6.  Carotid stenosis   Dopplers ordered--right bruit < 50% b/l    Health Maintenance Due   Topic Date Due    DTaP/Tdap/Td series (1 - Tdap) 11/20/1956    Shingrix Vaccine Age 50> (1 of 2) 11/20/1985    FOOT EXAM Q1  10/19/2016    MICROALBUMIN Q1  04/19/2017    EYE EXAM RETINAL OR DILATED Q1  06/16/2017    GLAUCOMA SCREENING Q2Y  06/16/2018    Influenza Age 5 to Adult  08/01/2018    LIPID PANEL Q1  08/22/2018    HEMOGLOBIN A1C Q6M  09/08/2018    MEDICARE YEARLY EXAM  10/18/2018

## 2018-10-23 ENCOUNTER — OFFICE VISIT (OUTPATIENT)
Dept: INTERNAL MEDICINE CLINIC | Age: 83
End: 2018-10-23

## 2018-10-23 VITALS
OXYGEN SATURATION: 100 % | TEMPERATURE: 98 F | HEIGHT: 64 IN | HEART RATE: 56 BPM | DIASTOLIC BLOOD PRESSURE: 58 MMHG | WEIGHT: 135 LBS | SYSTOLIC BLOOD PRESSURE: 132 MMHG | BODY MASS INDEX: 23.05 KG/M2

## 2018-10-23 DIAGNOSIS — E78.00 PURE HYPERCHOLESTEROLEMIA: ICD-10-CM

## 2018-10-23 DIAGNOSIS — Z23 ENCOUNTER FOR IMMUNIZATION: ICD-10-CM

## 2018-10-23 DIAGNOSIS — F51.01 PRIMARY INSOMNIA: ICD-10-CM

## 2018-10-23 DIAGNOSIS — Z00.00 MEDICARE ANNUAL WELLNESS VISIT, SUBSEQUENT: ICD-10-CM

## 2018-10-23 DIAGNOSIS — I10 ESSENTIAL HYPERTENSION: ICD-10-CM

## 2018-10-23 DIAGNOSIS — I65.21 CAROTID STENOSIS, NON-SYMPTOMATIC, RIGHT: ICD-10-CM

## 2018-10-23 DIAGNOSIS — E11.9 TYPE 2 DIABETES MELLITUS WITHOUT COMPLICATION, WITHOUT LONG-TERM CURRENT USE OF INSULIN (HCC): Primary | ICD-10-CM

## 2018-10-23 NOTE — LETTER
10/24/2018 12:56 PM 
 
[address] [id number] Dear Jonna Ramirez: Please find your most recent results below. Resulted Orders HEMOGLOBIN A1C WITH EAG Result Value Ref Range Hemoglobin A1c 6.8 (H) 4.8 - 5.6 % Comment:  
            Prediabetes: 5.7 - 6.4 Diabetes: >6.4 Glycemic control for adults with diabetes: <7.0 Estimated average glucose 148 mg/dL Narrative Performed at:  [address]  [id number] : Bella Danielle MD, Phone:  [phone] METABOLIC PANEL, COMPREHENSIVE Result Value Ref Range Glucose 108 (H) 65 - 99 mg/dL BUN 12 8 - 27 mg/dL Creatinine 0.76 0.57 - 1.00 mg/dL GFR est non-AA 73 >59 mL/min/1.73 GFR est AA 84 >59 mL/min/1.73  
 BUN/Creatinine ratio 16 12 - 28 Sodium 138 134 - 144 mmol/L Potassium 4.3 3.5 - 5.2 mmol/L Chloride 95 (L) 96 - 106 mmol/L  
 CO2 26 20 - 29 mmol/L Calcium 9.7 8.7 - 10.3 mg/dL Protein, total 7.1 6.0 - 8.5 g/dL Albumin 4.3 3.5 - 4.7 g/dL GLOBULIN, TOTAL 2.8 1.5 - 4.5 g/dL A-G Ratio 1.5 1.2 - 2.2 Bilirubin, total 0.2 0.0 - 1.2 mg/dL Alk. phosphatase 96 39 - 117 IU/L  
 AST (SGOT) 24 0 - 40 IU/L  
 ALT (SGPT) 9 0 - 32 IU/L Narrative Performed at:  [address]  [id number] : Bella Danielle MD, Phone:  [phone] LIPID PANEL Result Value Ref Range Cholesterol, total 152 100 - 199 mg/dL Triglyceride 82 0 - 149 mg/dL HDL Cholesterol 63 >39 mg/dL VLDL, calculated 16 5 - 40 mg/dL LDL, calculated 73 0 - 99 mg/dL Narrative Performed at:  [address]  [id number] : Bella Danielle MD, Phone:  [phone] MICROALBUMIN, UR, RAND W/ MICROALB/CREAT RATIO Result Value Ref Range Creatinine, urine 104.6 Not Estab. mg/dL Microalbumin, urine 12.6 Not Estab. ug/mL Microalb/Creat ratio (ug/mg creat.) 12.0 0.0 - 30.0 mg/g creat Comment:  
                        Normal:                0.0 -  30.0 Albuminuria:          31.0 - 300.0 Clinical albuminuria:       >300.0 Narrative Performed at:  [address]  [id number] : Gilbert Thomas MD, Phone:  [phone] CBC W/O DIFF Result Value Ref Range WBC 4.5 3.4 - 10.8 x10E3/uL  
 RBC 4.05 3.77 - 5.28 x10E6/uL HGB 11.4 11.1 - 15.9 g/dL HCT 35.0 34.0 - 46.6 % MCV 86 79 - 97 fL  
 MCH 28.1 26.6 - 33.0 pg  
 MCHC 32.6 31.5 - 35.7 g/dL  
 RDW 14.9 12.3 - 15.4 % PLATELET 843 095 - 921 x10E3/uL Narrative Performed at:  [address]  [id number] : Gilbert Thomas MD, Phone:  [phone] RECOMMENDATIONS: 
None. Keep up the good work! Continue with current  diet and medications. Your 3 month glucose average is 148--good control of diabetes. Normal blood cell counts, kidney,liver, electrolytes,cholesterol, urine protein. Please call me if you have any questions: [phone] Sincerely, 
 
 
Solo Sanabria MD

## 2018-10-23 NOTE — PATIENT INSTRUCTIONS
Medicare Wellness Visit, Female The best way to live healthy is to have a lifestyle where you eat a well-balanced diet, exercise regularly, limit alcohol use, and quit all forms of tobacco/nicotine, if applicable. Regular preventive services are another way to keep healthy. Preventive services (vaccines, screening tests, monitoring & exams) can help personalize your care plan, which helps you manage your own care. Screening tests can find health problems at the earliest stages, when they are easiest to treat. [facility] follows the current, evidence-based guidelines published by the [facility] ([facility]) when recommending preventive services for our patients. Because we follow these guidelines, sometimes recommendations change over time as research supports it. (For example, mammograms used to be recommended annually. Even though Medicare will still pay for an annual mammogram, the newer guidelines recommend a mammogram every two years for women of average risk.) Of course, you and your doctor may decide to screen more often for some diseases, based on your risk and your health status. Preventive services for you include: - Medicare offers their members a free annual wellness visit, which is time for you and your primary care provider to discuss and plan for your preventive service needs. Take advantage of this benefit every year! 
-All adults over the age of 72 should receive the recommended pneumonia vaccines. Current USPSTF guidelines recommend a series of two vaccines for the best pneumonia protection.  
-All adults should have a flu vaccine yearly and a tetanus vaccine every 10 years. All adults age 61 and older should receive a shingles vaccine once in their lifetime.   
-A bone mass density test is recommended when a woman turns 65 to screen for osteoporosis. This test is only recommended one time, as a screening. Some providers will use this same test as a disease monitoring tool if you already have osteoporosis. -All adults age 38-68 who are overweight should have a diabetes screening test once every three years.  
-Other screening tests and preventive services for persons with diabetes include: an eye exam to screen for diabetic retinopathy, a kidney function test, a foot exam, and stricter control over your cholesterol.  
-Cardiovascular screening for adults with routine risk involves an electrocardiogram (ECG) at intervals determined by your doctor.  
-Colorectal cancer screenings should be done for adults age 54-65 with no increased risk factors for colorectal cancer. There are a number of acceptable methods of screening for this type of cancer. Each test has its own benefits and drawbacks. Discuss with your doctor what is most appropriate for you during your annual wellness visit. The different tests include: colonoscopy (considered the best screening method), a fecal occult blood test, a fecal DNA test, and sigmoidoscopy. -Breast cancer screenings are recommended every other year for women of normal risk, age 54-69. 
-Cervical cancer screenings for women over age 72 are only recommended with certain risk factors.  
-All adults born between [facility] should be screened once for Hepatitis C. Here is a list of your current Health Maintenance items (your personalized list of preventive services) with a due date: 
Health Maintenance Due Topic Date Due  
 DTaP/Tdap/Td  (1 - Tdap) 11/20/1956  Shingles Vaccine (1 of 2) 11/20/1985 [address] [place] [address] Diabetic Foot Care  10/19/2016  Albumin Urine Test  04/19/2017 [address] [place] [address] Eye Exam  06/16/2017  Glaucoma Screening   06/16/2018  Flu Vaccine  08/01/2018  Cholesterol Test   08/22/2018  Hemoglobin A1C    09/08/2018 [address] [place] [address] Annual Well Visit  10/18/2018

## 2018-10-23 NOTE — PROGRESS NOTES
Chief Complaint   Patient presents with   [facility] Annual Wellness Visit     yearly    Diabetes     6 month follow up    Hypertension     6 month follow up    Cholesterol Problem     6 month follow up

## 2018-10-24 LAB
ALBUMIN SERPL-MCNC: 4.3 G/DL (ref 3.5–4.7)
ALBUMIN/CREAT UR: 12 MG/G CREAT (ref 0–30)
ALBUMIN/GLOB SERPL: 1.5 {RATIO} (ref 1.2–2.2)
ALP SERPL-CCNC: 96 IU/L (ref 39–117)
ALT SERPL-CCNC: 9 IU/L (ref 0–32)
AST SERPL-CCNC: 24 IU/L (ref 0–40)
BILIRUB SERPL-MCNC: 0.2 MG/DL (ref 0–1.2)
BUN SERPL-MCNC: 12 MG/DL (ref 8–27)
BUN/CREAT SERPL: 16 (ref 12–28)
CALCIUM SERPL-MCNC: 9.7 MG/DL (ref 8.7–10.3)
CHLORIDE SERPL-SCNC: 95 MMOL/L (ref 96–106)
CHOLEST SERPL-MCNC: 152 MG/DL (ref 100–199)
CO2 SERPL-SCNC: 26 MMOL/L (ref 20–29)
CREAT SERPL-MCNC: 0.76 MG/DL (ref 0.57–1)
CREAT UR-MCNC: 104.6 MG/DL
ERYTHROCYTE [DISTWIDTH] IN BLOOD BY AUTOMATED COUNT: 14.9 % (ref 12.3–15.4)
EST. AVERAGE GLUCOSE BLD GHB EST-MCNC: 148 MG/DL
GLOBULIN SER CALC-MCNC: 2.8 G/DL (ref 1.5–4.5)
GLUCOSE SERPL-MCNC: 108 MG/DL (ref 65–99)
HBA1C MFR BLD: 6.8 % (ref 4.8–5.6)
HCT VFR BLD AUTO: 35 % (ref 34–46.6)
HDLC SERPL-MCNC: 63 MG/DL
HGB BLD-MCNC: 11.4 G/DL (ref 11.1–15.9)
LDLC SERPL CALC-MCNC: 73 MG/DL (ref 0–99)
MCH RBC QN AUTO: 28.1 PG (ref 26.6–33)
MCHC RBC AUTO-ENTMCNC: 32.6 G/DL (ref 31.5–35.7)
MCV RBC AUTO: 86 FL (ref 79–97)
MICROALBUMIN UR-MCNC: 12.6 UG/ML
PLATELET # BLD AUTO: 271 X10E3/UL (ref 150–379)
POTASSIUM SERPL-SCNC: 4.3 MMOL/L (ref 3.5–5.2)
PROT SERPL-MCNC: 7.1 G/DL (ref 6–8.5)
RBC # BLD AUTO: 4.05 X10E6/UL (ref 3.77–5.28)
SODIUM SERPL-SCNC: 138 MMOL/L (ref 134–144)
TRIGL SERPL-MCNC: 82 MG/DL (ref 0–149)
VLDLC SERPL CALC-MCNC: 16 MG/DL (ref 5–40)
WBC # BLD AUTO: 4.5 X10E3/UL (ref 3.4–10.8)

## 2018-12-20 ENCOUNTER — TELEPHONE (OUTPATIENT)
Dept: INTERNAL MEDICINE CLINIC | Age: 83
End: 2018-12-20

## 2018-12-20 NOTE — TELEPHONE ENCOUNTER
Spoke with patient after 2 patient identifiers being note and advised that she should reach outto her pharmacy to see if her medication had been recalled . Patient expressed understanding and has no further questions at this time.

## 2018-12-20 NOTE — TELEPHONE ENCOUNTER
Pt says she saw on the news that one of her medications is being recalled due to containing cancer-causing agents. Pt would like to speak to a nurse.        Message received & copied from [facility]

## 2019-02-01 RX ORDER — ATORVASTATIN CALCIUM 20 MG/1
TABLET, FILM COATED ORAL
Qty: 30 TAB | Refills: 11 | Status: SHIPPED | OUTPATIENT
Start: 2019-02-01 | End: 2020-03-11

## 2019-03-27 DIAGNOSIS — F51.01 PRIMARY INSOMNIA: ICD-10-CM

## 2019-03-27 RX ORDER — TEMAZEPAM 15 MG/1
CAPSULE ORAL
Qty: 30 CAP | Refills: 5 | Status: SHIPPED | OUTPATIENT
Start: 2019-03-27 | End: 2019-09-23 | Stop reason: SDUPTHER

## 2019-03-28 ENCOUNTER — TELEPHONE (OUTPATIENT)
Dept: INTERNAL MEDICINE CLINIC | Age: 84
End: 2019-03-28

## 2019-04-13 DIAGNOSIS — I10 ESSENTIAL HYPERTENSION: ICD-10-CM

## 2019-04-13 RX ORDER — LISINOPRIL AND HYDROCHLOROTHIAZIDE 12.5; 2 MG/1; MG/1
TABLET ORAL
Qty: 180 TAB | Refills: 3 | Status: SHIPPED | OUTPATIENT
Start: 2019-04-13 | End: 2020-04-07

## 2019-04-13 RX ORDER — METFORMIN HYDROCHLORIDE 500 MG/1
TABLET ORAL
Qty: 270 TAB | Refills: 3 | Status: SHIPPED | OUTPATIENT
Start: 2019-04-13 | End: 2020-04-07

## 2019-04-23 ENCOUNTER — OFFICE VISIT (OUTPATIENT)
Dept: INTERNAL MEDICINE CLINIC | Age: 84
End: 2019-04-23

## 2019-04-23 VITALS
HEART RATE: 62 BPM | HEIGHT: 64 IN | DIASTOLIC BLOOD PRESSURE: 67 MMHG | WEIGHT: 134 LBS | SYSTOLIC BLOOD PRESSURE: 126 MMHG | BODY MASS INDEX: 22.88 KG/M2 | TEMPERATURE: 97.6 F | OXYGEN SATURATION: 99 % | RESPIRATION RATE: 18 BRPM

## 2019-04-23 DIAGNOSIS — E11.9 CONTROLLED TYPE 2 DIABETES MELLITUS WITHOUT COMPLICATION, WITHOUT LONG-TERM CURRENT USE OF INSULIN (HCC): Primary | ICD-10-CM

## 2019-04-23 DIAGNOSIS — I10 ESSENTIAL HYPERTENSION: ICD-10-CM

## 2019-04-23 DIAGNOSIS — Z12.39 BREAST SCREENING: ICD-10-CM

## 2019-04-23 DIAGNOSIS — E78.00 PURE HYPERCHOLESTEROLEMIA: ICD-10-CM

## 2019-04-23 DIAGNOSIS — F51.04 CHRONIC INSOMNIA: ICD-10-CM

## 2019-04-23 NOTE — PROGRESS NOTES
HISTORY OF PRESENT ILLNESS  Fco Nuñez is a 80 y.o. female. HPI      F/u DM-2 HTN  HLD, mild Carotid artery stenosis, chronic insomnia  Last a1c 6.8 LDL 73  fsbs at home--around 110 fasting in monirngs  Due for foot exam  Takes restoril qhs for insomnia    Goes [facility] yearly and gets labs  Stopped mammograms but performs monthly BSE  Sister  of breast cancer    Last OV      Last a1c 6.6 LDL 71  Takes temazepam nightly which works for sleep  fsbs avg 110 qam  Goes to [facility] next month  No cp sob neuropathty        Last OV  Goes to [facility] q6 mos with labs--last LDL 77  fsbs 80-90's, highest 102  Carotid dopplers last visit-<50% b/l  C/o neck pain and wrist pain     Hx L spine osteopnie  at PCP office, not taking calcium or vit D  Last visit:  Went to [facility] lat week and had labs--a1c, lipids, urine checked per pt  C/o left hand numbness left hand fingerall 5 for years. sxs occur at night  S/p right carpal release years Dr Blas Kern.    fsbs 107-110  in morning per patient, lowest was 80's  Goes to [facility] clinic q 6 months  No cp or sob sxs  Had some fuuny sensation like gas in chest yesterday and checked pulse which was slipping beats  Had mobile mammogram from [facility] recently which was wnl per pt             Patient Active Problem List    Diagnosis Date Noted    Osteopenia of spine 2018    Bilateral carotid artery stenosis 2016    Chest pain, unspecified 2012    Insomnia 2010    Family history of breast cancer in first degree relative 2010    Type 2 diabetes mellitus without complication ([facility] [address]     Essential hypertension, benign 2009    Pure hypercholesterolemia 2009     Current Outpatient Medications   Medication Sig Dispense Refill    lisinopril-hydroCHLOROthiazide (PRINZIDE, ZESTORETIC) 20-12.5 mg per tablet TAKE 1 TABLET BY MOUTH TWICE DAILY 180 Tab 3    metFORMIN (GLUCOPHAGE) 500 mg tablet TAKE 1 TABLET BY MOUTH IN THE MORNING AND 2 IN THE EVENING 270 Tab 3    temazepam (RESTORIL) 15 mg capsule TAKE 1 CAPSULE BY MOUTH AT BEDTIME AS NEEDED FOR  SLEEP 30 Cap 5    atorvastatin (LIPITOR) 20 mg tablet TAKE ONE TABLET BY MOUTH EVERY OTHER DAY 30 Tab 11    hydrALAZINE (APRESOLINE) 25 mg tablet TAKE ONE TABLET BY MOUTH THREE TIMES DAILY 90 Tab 11    FREESTYLE LITE STRIPS strip USE ONE STRIP TO CHECK GLUCOSE ONCE DAILY 100 Strip 11    FREESTYLE LANCETS 28 gauge misc USE ONE  TO CHECK GLUCOSE ONCE DAILY 100 Lancet 11    metoprolol succinate (TOPROL-XL) 100 mg tablet TAKE ONE TABLET BY MOUTH ONCE DAILY 30 Tab 11    amLODIPine (NORVASC) 10 mg tablet TAKE ONE TABLET BY MOUTH ONCE DAILY 90 Tab 3    ibuprofen (MOTRIN) 400 mg tablet Take 1 Tab by mouth every eight (8) hours as needed for Pain. 30 Tab 5    methocarbamol (ROBAXIN) 500 mg tablet Take 1 Tab by mouth every twelve (12) hours. 20 Tab 0    Cholecalciferol, Vitamin D3, (VITAMIN D3) 1,000 unit Cap Take  by mouth.  Omega-3-DHA-EPA-Fish Oil 1,000 (120-180) mg Cap Take  by mouth.  aspirin 81 mg Tab Take  by mouth daily.        No Known Allergies   Lab Results   Component Value Date/Time    WBC 4.5 10/23/2018 09:45 AM    HGB 11.4 10/23/2018 09:45 AM    HCT 35.0 10/23/2018 09:45 AM    PLATELET 241 25/75/0708 09:45 AM    MCV 86 10/23/2018 09:45 AM     Lab Results   Component Value Date/Time    Hemoglobin A1c 6.8 (H) 10/23/2018 09:45 AM    Hemoglobin A1c 6.6 (H) 03/08/2018 10:15 AM    Hemoglobin A1c 6.8 (H) 02/28/2017 09:58 AM    Hemoglobin A1c, External 7.0 on 10/02/2014 Dr. Haylie Del Real 10/02/2014    Hemoglobin A1c, External 6.6 on 10/2/14 Dr. Haylie Del Real 10/02/2014    Glucose 108 (H) 10/23/2018 09:45 AM    Glucose (POC) 113 (H) 07/25/2012 01:49 PM    Microalb/Creat ratio (ug/mg creat.) 12.0 10/23/2018 09:45 AM    LDL, calculated 73 10/23/2018 09:45 AM    Creatinine 0.76 10/23/2018 09:45 AM      Lab Results   Component Value Date/Time    Cholesterol, total 152 10/23/2018 09:45 AM    Cholesterol (POC) 160 01/28/2011 08:38 AM    HDL Cholesterol 63 10/23/2018 09:45 AM    LDL, calculated 73 10/23/2018 09:45 AM    LDL Cholesterol (POC) 45 01/28/2011 08:38 AM    LDL-C, External 71 on 10/02/14 Dr. Mary Kennedy 10/02/2014    Triglyceride 82 10/23/2018 09:45 AM    Triglycerides (POC) 281 01/28/2011 08:38 AM    CHOL/HDL Ratio 2.2 07/30/2010 09:21 AM     Lab Results   Component Value Date/Time    GFR est non-AA 73 10/23/2018 09:45 AM    GFR est AA 84 10/23/2018 09:45 AM    Creatinine 0.76 10/23/2018 09:45 AM    BUN 12 10/23/2018 09:45 AM    Sodium 138 10/23/2018 09:45 AM    Potassium 4.3 10/23/2018 09:45 AM    Chloride 95 (L) 10/23/2018 09:45 AM    CO2 26 10/23/2018 09:45 AM        ROS    Physical Exam   Constitutional: She appears well-developed and well-nourished. Appears stated age   Cardiovascular: Normal rate, regular rhythm and normal heart sounds. Exam reveals no gallop and no friction rub. No murmur heard. Pulmonary/Chest: Effort normal and breath sounds normal. No respiratory distress. She has no wheezes. Abdominal: Soft. Bowel sounds are normal.   Musculoskeletal: She exhibits no edema. Neurological: She is alert. Diabetic foot exam performed by Supriya Urbina MD       Measurement  Response Nurse Comment Physician Comment  Monofilament  R - normal sensation with micro filament  L - normal sensation with micro filament    Pulse DP R - 2+ (normal)  L - 2+ (normal)    Pulse TP R - 2+ (normal)  L - 2+ (normal)    Structural deformity R - None  L - None    Skin Integrity / Deformity R - None  L - None       Reviewed by:         Psychiatric: She has a normal mood and affect. Nursing note and vitals reviewed. ASSESSMENT and PLAN  Diagnoses and all orders for this visit:    1.  Controlled type 2 diabetes mellitus without complication, without long-term current use of insulin (HCC)  -     METABOLIC PANEL, COMPREHENSIVE  -     HEMOGLOBIN A1C WITH EAG  -  DIABETES FOOT EXAM   Controlled per home readings  2. Essential hypertension  -     METABOLIC PANEL, COMPREHENSIVE   Good control  3. Pure hypercholesterolemia  -     METABOLIC PANEL, COMPREHENSIVE  -     TSH 3RD GENERATION   Continue statin  4. Chronic insomnia   On restoril qhs  5. Breast screening  -     [facility] MAMMO BI SCREENING INCL CAD;  Future

## 2019-04-24 LAB
ALBUMIN SERPL-MCNC: 4.5 G/DL (ref 3.5–4.7)
ALBUMIN/GLOB SERPL: 1.8 {RATIO} (ref 1.2–2.2)
ALP SERPL-CCNC: 89 IU/L (ref 39–117)
ALT SERPL-CCNC: 13 IU/L (ref 0–32)
AST SERPL-CCNC: 26 IU/L (ref 0–40)
BILIRUB SERPL-MCNC: 0.2 MG/DL (ref 0–1.2)
BUN SERPL-MCNC: 13 MG/DL (ref 8–27)
BUN/CREAT SERPL: 17 (ref 12–28)
CALCIUM SERPL-MCNC: 9.9 MG/DL (ref 8.7–10.3)
CHLORIDE SERPL-SCNC: 98 MMOL/L (ref 96–106)
CO2 SERPL-SCNC: 27 MMOL/L (ref 20–29)
CREAT SERPL-MCNC: 0.76 MG/DL (ref 0.57–1)
EST. AVERAGE GLUCOSE BLD GHB EST-MCNC: 143 MG/DL
GLOBULIN SER CALC-MCNC: 2.5 G/DL (ref 1.5–4.5)
GLUCOSE SERPL-MCNC: 113 MG/DL (ref 65–99)
HBA1C MFR BLD: 6.6 % (ref 4.8–5.6)
POTASSIUM SERPL-SCNC: 4.8 MMOL/L (ref 3.5–5.2)
PROT SERPL-MCNC: 7 G/DL (ref 6–8.5)
SODIUM SERPL-SCNC: 141 MMOL/L (ref 134–144)
TSH SERPL DL<=0.005 MIU/L-ACNC: 1.78 UIU/ML (ref 0.45–4.5)

## 2019-05-01 ENCOUNTER — HOSPITAL ENCOUNTER (OUTPATIENT)
Dept: MAMMOGRAPHY | Age: 84
Discharge: HOME | End: 2019-05-01
Attending: INTERNAL MEDICINE
Payer: MEDICARE

## 2019-05-01 DIAGNOSIS — Z12.39 BREAST SCREENING: ICD-10-CM

## 2019-05-01 PROCEDURE — 77067 SCR MAMMO BI INCL CAD: CPT

## 2019-06-13 RX ORDER — AMLODIPINE BESYLATE 10 MG/1
TABLET ORAL
Qty: 90 TAB | Refills: 3 | Status: SHIPPED | OUTPATIENT
Start: 2019-06-13 | End: 2020-05-06 | Stop reason: SDUPTHER

## 2019-07-24 RX ORDER — METOPROLOL SUCCINATE 100 MG/1
TABLET, EXTENDED RELEASE ORAL
Qty: 30 TAB | Refills: 11 | Status: SHIPPED | OUTPATIENT
Start: 2019-07-24 | End: 2020-05-06 | Stop reason: SDUPTHER

## 2019-09-23 DIAGNOSIS — F51.01 PRIMARY INSOMNIA: ICD-10-CM

## 2019-09-23 RX ORDER — TEMAZEPAM 15 MG/1
CAPSULE ORAL
Qty: 30 CAP | Refills: 5 | Status: SHIPPED | OUTPATIENT
Start: 2019-09-23 | End: 2019-09-24 | Stop reason: SDUPTHER

## 2019-09-24 DIAGNOSIS — F51.01 PRIMARY INSOMNIA: ICD-10-CM

## 2019-09-24 RX ORDER — TEMAZEPAM 15 MG/1
CAPSULE ORAL
Qty: 30 CAP | Refills: 5 | Status: SHIPPED | OUTPATIENT
Start: 2019-09-24 | End: 2020-03-16

## 2019-09-25 ENCOUNTER — TELEPHONE (OUTPATIENT)
Dept: INTERNAL MEDICINE CLINIC | Age: 84
End: 2019-09-25

## 2019-09-27 DIAGNOSIS — E11.9 TYPE 2 DIABETES MELLITUS WITHOUT COMPLICATION, WITHOUT LONG-TERM CURRENT USE OF INSULIN (HCC): ICD-10-CM

## 2019-09-27 RX ORDER — LANCETS 28 GAUGE
EACH MISCELLANEOUS
Qty: 100 LANCET | Refills: 11 | Status: SHIPPED | OUTPATIENT
Start: 2019-09-27 | End: 2019-09-28 | Stop reason: SDUPTHER

## 2019-09-27 RX ORDER — BLOOD-GLUCOSE METER
KIT MISCELLANEOUS
Qty: 100 STRIP | Refills: 11 | Status: SHIPPED | OUTPATIENT
Start: 2019-09-27 | End: 2020-05-06 | Stop reason: SDUPTHER

## 2019-09-27 RX ORDER — HYDRALAZINE HYDROCHLORIDE 25 MG/1
TABLET, FILM COATED ORAL
Qty: 90 TAB | Refills: 11 | Status: SHIPPED | OUTPATIENT
Start: 2019-09-27 | End: 2020-05-06 | Stop reason: SDUPTHER

## 2019-09-28 RX ORDER — LANCETS 28 GAUGE
EACH MISCELLANEOUS
Qty: 100 LANCET | Refills: 11 | Status: SHIPPED | OUTPATIENT
Start: 2019-09-28 | End: 2020-05-07 | Stop reason: SDUPTHER

## 2019-10-23 ENCOUNTER — OFFICE VISIT (OUTPATIENT)
Dept: INTERNAL MEDICINE CLINIC | Age: 84
End: 2019-10-23

## 2019-10-23 VITALS
HEIGHT: 64 IN | RESPIRATION RATE: 16 BRPM | SYSTOLIC BLOOD PRESSURE: 168 MMHG | OXYGEN SATURATION: 99 % | TEMPERATURE: 97.5 F | HEART RATE: 71 BPM | DIASTOLIC BLOOD PRESSURE: 65 MMHG | WEIGHT: 131 LBS | BODY MASS INDEX: 22.36 KG/M2

## 2019-10-23 DIAGNOSIS — E78.49 OTHER HYPERLIPIDEMIA: ICD-10-CM

## 2019-10-23 DIAGNOSIS — E11.9 CONTROLLED TYPE 2 DIABETES MELLITUS WITHOUT COMPLICATION, WITHOUT LONG-TERM CURRENT USE OF INSULIN (HCC): Primary | ICD-10-CM

## 2019-10-23 DIAGNOSIS — R41.3 MEMORY LOSS: ICD-10-CM

## 2019-10-23 DIAGNOSIS — I10 ESSENTIAL HYPERTENSION: ICD-10-CM

## 2019-10-23 DIAGNOSIS — Z00.00 MEDICARE ANNUAL WELLNESS VISIT, SUBSEQUENT: ICD-10-CM

## 2019-10-23 NOTE — PROGRESS NOTES
HISTORY OF PRESENT ILLNESS  Barb Marcelino is a 80 y.o. female. HPI        F/u DM-2 HTN  HLD, mild Carotid artery stenosis, chronic insomnia and medicare wellness-----------  Last a1c 6.6. LDL 73  Fell 3 times since last OV when reaching for things but no injury. Feels steady walking  fsbs around 80-90's  Goes to [facility] clinic and will get flu shot then    Reports forgetfullness and started taking prevagen this year.       Last OV  Last a1c 6.8 LDL 73  fsbs at home--around 110 fasting in monirngs  Due for foot exam  Takes restoril qhs for insomnia     Goes to [facility] yearly and gets labs  Stopped mammograms but performs monthly BSE  Sister  of breast cancer       Patient Active Problem List    Diagnosis Date Noted    Osteopenia of spine 2018    Bilateral carotid artery stenosis 2016    Chest pain, unspecified 2012    Insomnia 2010    Family history of breast cancer in first degree relative 2010    Type 2 diabetes mellitus without complication ([facility]     Essential hypertension, benign 2009    Pure hypercholesterolemia 2009     Current Outpatient Medications   Medication Sig Dispense Refill    lancets (FREESTYLE LANCETS) 28 gauge misc USE 1  TO CHECK GLUCOSE ONCE DAILY 100 Lancet 11    hydrALAZINE (APRESOLINE) 25 mg tablet TAKE 1 TABLET BY MOUTH THREE TIMES DAILY 90 Tab 11    FREESTYLE LITE STRIPS strip USE 1 STRIP TO CHECK GLUCOSE ONCE DAILY 100 Strip 11    temazepam (RESTORIL) 15 mg capsule TAKE 1 CAPSULE BY MOUTH AT BEDTIME AS NEEDED FOR SLEEP 30 Cap 5    metoprolol succinate (TOPROL-XL) 100 mg tablet TAKE 1 TABLET BY MOUTH ONCE DAILY 30 Tab 11    amLODIPine (NORVASC) 10 mg tablet TAKE 1 TABLET BY MOUTH ONCE DAILY 90 Tab 3    lisinopril-hydroCHLOROthiazide (PRINZIDE, ZESTORETIC) 20-12.5 mg per tablet TAKE 1 TABLET BY MOUTH TWICE DAILY 180 Tab 3    metFORMIN (GLUCOPHAGE) 500 mg tablet TAKE 1 TABLET BY MOUTH IN THE MORNING AND 2 IN THE EVENING 270 Tab 3    atorvastatin (LIPITOR) 20 mg tablet TAKE ONE TABLET BY MOUTH EVERY OTHER DAY 30 Tab 11    ibuprofen (MOTRIN) 400 mg tablet Take 1 Tab by mouth every eight (8) hours as needed for Pain. 30 Tab 5    methocarbamol (ROBAXIN) 500 mg tablet Take 1 Tab by mouth every twelve (12) hours. 20 Tab 0    Cholecalciferol, Vitamin D3, (VITAMIN D3) 1,000 unit Cap Take  by mouth.  Omega-3-DHA-EPA-Fish Oil 1,000 (120-180) mg Cap Take  by mouth.  aspirin 81 mg Tab Take  by mouth daily.        No Known Allergies  Social History     Tobacco Use    Smoking status: Never Smoker    Smokeless tobacco: Never Used   Substance Use Topics    Alcohol use: No      Lab Results   Component Value Date/Time    WBC 4.5 10/23/2018 09:45 AM    HGB 11.4 10/23/2018 09:45 AM    HCT 35.0 10/23/2018 09:45 AM    PLATELET 200 13/67/1378 09:45 AM    MCV 86 10/23/2018 09:45 AM     Lab Results   Component Value Date/Time    Hemoglobin A1c 6.6 (H) 04/23/2019 10:00 AM    Hemoglobin A1c 6.8 (H) 10/23/2018 09:45 AM    Hemoglobin A1c 6.6 (H) 03/08/2018 10:15 AM    Hemoglobin A1c, External 7.0 on 10/02/2014 Dr. Anika Loomis 10/02/2014    Hemoglobin A1c, External 6.6 on 10/2/14 Dr. Anika Loomis 10/02/2014    Glucose 113 (H) 04/23/2019 10:00 AM    Glucose (POC) 113 (H) 07/25/2012 01:49 PM    Microalb/Creat ratio (ug/mg creat.) 12.0 10/23/2018 09:45 AM    LDL, calculated 73 10/23/2018 09:45 AM    Creatinine 0.76 04/23/2019 10:00 AM      Lab Results   Component Value Date/Time    Cholesterol, total 152 10/23/2018 09:45 AM    Cholesterol (POC) 160 01/28/2011 08:38 AM    HDL Cholesterol 63 10/23/2018 09:45 AM    LDL, calculated 73 10/23/2018 09:45 AM    LDL Cholesterol (POC) 45 01/28/2011 08:38 AM    LDL-C, External 71 on 10/02/14 Dr. Anika Loomis 10/02/2014    Triglyceride 82 10/23/2018 09:45 AM    Triglycerides (POC) 281 01/28/2011 08:38 AM    CHOL/HDL Ratio 2.2 07/30/2010 09:21 AM     Lab Results   Component Value Date/Time    GFR est non-AA 73 04/23/2019 10:00 AM    GFR est AA 84 04/23/2019 10:00 AM    Creatinine 0.76 04/23/2019 10:00 AM    BUN 13 04/23/2019 10:00 AM    Sodium 141 04/23/2019 10:00 AM    Potassium 4.8 04/23/2019 10:00 AM    Chloride 98 04/23/2019 10:00 AM    CO2 27 04/23/2019 10:00 AM        ROS    Physical Exam   Constitutional: She appears well-developed and well-nourished. Appears stated age   Cardiovascular: Normal rate, regular rhythm and normal heart sounds. Exam reveals no gallop and no friction rub. No murmur heard. Pulmonary/Chest: Effort normal and breath sounds normal. No respiratory distress. She has no wheezes. Abdominal: Soft. Bowel sounds are normal.   Musculoskeletal: She exhibits no edema. Neurological: She is alert. Psychiatric: She has a normal mood and affect. Nursing note and vitals reviewed. ASSESSMENT and PLAN  Diagnoses and all orders for this visit:    1. Controlled type 2 diabetes mellitus without complication, without long-term current use of insulin (HCC)  -     HEMOGLOBIN A1C WITH EAG  -     MICROALBUMIN, UR, RAND W/ MICROALB/CREAT RATIO  -     METABOLIC PANEL, COMPREHENSIVE    2. Essential hypertension  -     METABOLIC PANEL, COMPREHENSIVE  -     CBC W/O DIFF    3. Other hyperlipidemia  -     LIPID PANEL  -     METABOLIC PANEL, COMPREHENSIVE  4. Memory loss   Refer to neurology         This is the Subsequent Medicare Annual Wellness Exam, performed 12 months or more after the Initial AWV or the last Subsequent AWV    I have reviewed the patient's medical history in detail and updated the computerized patient record.      History     Past Medical History:   Diagnosis Date    Arrhythmia     Arthritis     Bilateral cataracts     Chest pain     Diabetes ([address]     Essential hypertension     Hypercholesteremia     Hypertension       Past Surgical History:   Procedure Laterality Date    HX GYN      D & C    HX ORTHOPAEDIC      Carpal tunnel release right wrist     Current Outpatient Medications   Medication Sig Dispense Refill    lancets (FREESTYLE LANCETS) 28 gauge misc USE 1  TO CHECK GLUCOSE ONCE DAILY 100 Lancet 11    hydrALAZINE (APRESOLINE) 25 mg tablet TAKE 1 TABLET BY MOUTH THREE TIMES DAILY 90 Tab 11    FREESTYLE LITE STRIPS strip USE 1 STRIP TO CHECK GLUCOSE ONCE DAILY 100 Strip 11    temazepam (RESTORIL) 15 mg capsule TAKE 1 CAPSULE BY MOUTH AT BEDTIME AS NEEDED FOR SLEEP 30 Cap 5    metoprolol succinate (TOPROL-XL) 100 mg tablet TAKE 1 TABLET BY MOUTH ONCE DAILY 30 Tab 11    amLODIPine (NORVASC) 10 mg tablet TAKE 1 TABLET BY MOUTH ONCE DAILY 90 Tab 3    lisinopril-hydroCHLOROthiazide (PRINZIDE, ZESTORETIC) 20-12.5 mg per tablet TAKE 1 TABLET BY MOUTH TWICE DAILY 180 Tab 3    metFORMIN (GLUCOPHAGE) 500 mg tablet TAKE 1 TABLET BY MOUTH IN THE MORNING AND 2 IN THE EVENING 270 Tab 3    atorvastatin (LIPITOR) 20 mg tablet TAKE ONE TABLET BY MOUTH EVERY OTHER DAY 30 Tab 11    ibuprofen (MOTRIN) 400 mg tablet Take 1 Tab by mouth every eight (8) hours as needed for Pain. 30 Tab 5    Cholecalciferol, Vitamin D3, (VITAMIN D3) 1,000 unit Cap Take  by mouth.  Omega-3-DHA-EPA-Fish Oil 1,000 (120-180) mg Cap Take  by mouth.  aspirin 81 mg Tab Take  by mouth daily.  methocarbamol (ROBAXIN) 500 mg tablet Take 1 Tab by mouth every twelve (12) hours.  21 Tab 0     No Known Allergies  Family History   Problem Relation Age of Onset    Diabetes Mother     Heart Disease Father     Hypertension Father     Heart Disease Brother     Cancer Sister         breast    Cancer Brother         lung    Cancer Brother         lung    Diabetes Brother     Diabetes Brother     Other Brother         spinal meningitis    Diabetes Sister         leg amputation     Social History     Tobacco Use    Smoking status: Never Smoker    Smokeless tobacco: Never Used   Substance Use Topics    Alcohol use: No     Patient Active Problem List   Diagnosis Code    Type 2 diabetes mellitus without complication (HCC) Z91.9    Essential hypertension, benign I10    Pure hypercholesterolemia E78.00    Insomnia G47.00    Family history of breast cancer in first degree relative Z80.3    Chest pain, unspecified R07.9    Bilateral carotid artery stenosis I65.23    Osteopenia of spine M85.88       Depression Risk Factor Screening:     3 most recent PHQ Screens 10/23/2019   Little interest or pleasure in doing things Not at all   Feeling down, depressed, irritable, or hopeless Not at all   Total Score PHQ 2 0     Alcohol Risk Factor Screening: You do not drink alcohol or very rarely. Functional Ability and Level of Safety:   Hearing Loss  The patient needs further evaluation. Activities of Daily Living  The home contains: handrails and grab bars  Patient does total self care    Fall Risk  Fall Risk Assessment, last 12 mths 10/23/2019   Able to walk? Yes   Fall in past 12 months? Yes   Fall with injury? No   Number of falls in past 12 months 3   Fall Risk Score 3       Abuse Screen  Patient is not abused    Cognitive Screening   Evaluation of Cognitive Function:  Has your family/caregiver stated any concerns about your memory: no  Abnormal    Patient Care Team   Patient Care Team:  Johan Tran MD as PCP - Linda Turner MD (Ophthalmology)  Guy Rao MD (Cardiology)  Yoseph Teixeira MD (Orthopedic Surgery)    Assessment/Plan   Education and counseling provided:  Are appropriate based on today's review and evaluation  End-of-Life planning (with patient's consent)  tdap , shingrix and flu shot recommended    Diagnoses and all orders for this visit:    1. Controlled type 2 diabetes mellitus without complication, without long-term current use of insulin (HCC)  -     HEMOGLOBIN A1C WITH EAG  -     MICROALBUMIN, UR, RAND W/ MICROALB/CREAT RATIO  -     METABOLIC PANEL, COMPREHENSIVE   Controlled per home readings  2.  Essential hypertension  - METABOLIC PANEL, COMPREHENSIVE  -     CBC W/O DIFF   Reasonable control  3. Other hyperlipidemia  -     LIPID PANEL  -     METABOLIC PANEL, COMPREHENSIVE    4. Memory loss   Refer to neurology    13 animals on naming test   Unable to perform serial3 or spell WORLD backwards  5.  Falls   Prevention handout given to patient  Health Maintenance Due   Topic Date Due    DTaP/Tdap/Td series (1 - Tdap) 11/20/1956    Shingrix Vaccine Age 50> (1 of 2) 11/20/1985    HEMOGLOBIN A1C Q6M  10/23/2019    MICROALBUMIN Q1  10/23/2019    LIPID PANEL Q1  10/23/2019    MEDICARE YEARLY EXAM  10/24/2019

## 2019-10-23 NOTE — PATIENT INSTRUCTIONS
Office Policies Phone calls/patient messages: Please allow up to 24 hours for someone in the office to contact you about your call or message. Be mindful your provider may be out of the office or your message may require further review. We encourage you to use [employer] for your messages as this is a faster, more efficient way to communicate with our office Medication Refills: 
         
Prescription medications require 48-72 business hours to process. We encourage you to use [employer] for your refills. For controlled medications: Please allow 72 business hours to process. Certain medications may require you to  a written prescription at our office. NO narcotic/controlled medications will be prescribed after 4pm Monday through Friday or on weekends Form/Paperwork Completion: 
         
Please note a $25 fee may incur for all paperwork for completed by our providers. We ask that you allow 7-10 business days. Pre-payment is due prior to picking up/faxing the completed form. You may also download your forms to [employer] to have your doctor print off. Medicare Wellness Visit, Female The best way to live healthy is to have a lifestyle where you eat a well-balanced diet, exercise regularly, limit alcohol use, and quit all forms of tobacco/nicotine, if applicable. Regular preventive services are another way to keep healthy. Preventive services (vaccines, screening tests, monitoring & exams) can help personalize your care plan, which helps you manage your own care. Screening tests can find health problems at the earliest stages, when they are easiest to treat. [facility] follows the current, evidence-based guidelines published by the [facility] and [facility] [facility] [employer] (USPSTF) when recommending preventive services for our patients.  Because we follow these guidelines, sometimes recommendations change over time as research supports it. (For example, mammograms used to be recommended annually. Even though Medicare will still pay for an annual mammogram, the newer guidelines recommend a mammogram every two years for women of average risk.) Of course, you and your doctor may decide to screen more often for some diseases, based on your risk and your health status. Preventive services for you include: - Medicare offers their members a free annual wellness visit, which is time for you and your primary care provider to discuss and plan for your preventive service needs. Take advantage of this benefit every year! 
-All adults over the age of 72 should receive the recommended pneumonia vaccines. Current USPSTF guidelines recommend a series of two vaccines for the best pneumonia protection.  
-All adults should have a flu vaccine yearly and a tetanus vaccine every 10 years. All adults age 61 and older should receive a shingles vaccine once in their lifetime.   
-A bone mass density test is recommended when a woman turns 65 to screen for osteoporosis. This test is only recommended one time, as a screening. Some providers will use this same test as a disease monitoring tool if you already have osteoporosis. -All adults age 38-68 who are overweight should have a diabetes screening test once every three years.  
-Other screening tests and preventive services for persons with diabetes include: an eye exam to screen for diabetic retinopathy, a kidney function test, a foot exam, and stricter control over your cholesterol.  
-Cardiovascular screening for adults with routine risk involves an electrocardiogram (ECG) at intervals determined by your doctor.  
-Colorectal cancer screenings should be done for adults age 54-65 with no increased risk factors for colorectal cancer. There are a number of acceptable methods of screening for this type of cancer. Each test has its own benefits and drawbacks.  Discuss with your doctor what is most appropriate for you during your annual wellness visit. The different tests include: colonoscopy (considered the best screening method), a fecal occult blood test, a fecal DNA test, and sigmoidoscopy. -Breast cancer screenings are recommended every other year for women of normal risk, age 54-69. 
-Cervical cancer screenings for women over age 72 are only recommended with certain risk factors.  
-All adults born between [facility] should be screened once for Hepatitis C. Here is a list of your current Health Maintenance items (your personalized list of preventive services) with a due date: 
Health Maintenance Due Topic Date Due  
 DTaP/Tdap/Td  (1 - Tdap) 11/20/1956  Shingles Vaccine (1 of 2) 11/20/1985  Hemoglobin A1C    10/23/2019  Albumin Urine Test  10/23/2019  Cholesterol Test   10/23/2019 [address] [place] [address] Annual Well Visit  10/24/2019

## 2019-10-23 NOTE — ACP (ADVANCE CARE PLANNING)
Patient completed her ACP on today's visit. Copies were made and a copy was scanned   in patient's chart the remaining was given to patient.

## 2019-10-23 NOTE — PROGRESS NOTES
Chief Complaint   Patient presents with   [facility] Visit     Annual     Labs     Fasting    Fall     3 this year (unsteady) with reaching

## 2019-10-24 LAB
ALBUMIN SERPL-MCNC: 4.7 G/DL (ref 3.5–4.7)
ALBUMIN/CREAT UR: 15.3 MG/G CREAT (ref 0–30)
ALBUMIN/GLOB SERPL: 1.8 {RATIO} (ref 1.2–2.2)
ALP SERPL-CCNC: 88 IU/L (ref 39–117)
ALT SERPL-CCNC: 16 IU/L (ref 0–32)
AST SERPL-CCNC: 29 IU/L (ref 0–40)
BILIRUB SERPL-MCNC: 0.3 MG/DL (ref 0–1.2)
BUN SERPL-MCNC: 14 MG/DL (ref 8–27)
BUN/CREAT SERPL: 16 (ref 12–28)
CALCIUM SERPL-MCNC: 9.9 MG/DL (ref 8.7–10.3)
CHLORIDE SERPL-SCNC: 95 MMOL/L (ref 96–106)
CHOLEST SERPL-MCNC: 150 MG/DL (ref 100–199)
CO2 SERPL-SCNC: 25 MMOL/L (ref 20–29)
CREAT SERPL-MCNC: 0.85 MG/DL (ref 0.57–1)
CREAT UR-MCNC: 65.9 MG/DL
ERYTHROCYTE [DISTWIDTH] IN BLOOD BY AUTOMATED COUNT: 13.2 % (ref 12.3–15.4)
EST. AVERAGE GLUCOSE BLD GHB EST-MCNC: 137 MG/DL
GLOBULIN SER CALC-MCNC: 2.6 G/DL (ref 1.5–4.5)
GLUCOSE SERPL-MCNC: 108 MG/DL (ref 65–99)
HBA1C MFR BLD: 6.4 % (ref 4.8–5.6)
HCT VFR BLD AUTO: 34.8 % (ref 34–46.6)
HDLC SERPL-MCNC: 65 MG/DL
HGB BLD-MCNC: 11.7 G/DL (ref 11.1–15.9)
LDLC SERPL CALC-MCNC: 73 MG/DL (ref 0–99)
MCH RBC QN AUTO: 29 PG (ref 26.6–33)
MCHC RBC AUTO-ENTMCNC: 33.6 G/DL (ref 31.5–35.7)
MCV RBC AUTO: 86 FL (ref 79–97)
MICROALBUMIN UR-MCNC: 10.1 UG/ML
PLATELET # BLD AUTO: 273 X10E3/UL (ref 150–450)
POTASSIUM SERPL-SCNC: 3.7 MMOL/L (ref 3.5–5.2)
PROT SERPL-MCNC: 7.3 G/DL (ref 6–8.5)
RBC # BLD AUTO: 4.04 X10E6/UL (ref 3.77–5.28)
SODIUM SERPL-SCNC: 139 MMOL/L (ref 134–144)
TRIGL SERPL-MCNC: 61 MG/DL (ref 0–149)
VLDLC SERPL CALC-MCNC: 12 MG/DL (ref 5–40)
WBC # BLD AUTO: 3.1 X10E3/UL (ref 3.4–10.8)

## 2020-03-16 DIAGNOSIS — F51.01 PRIMARY INSOMNIA: ICD-10-CM

## 2020-03-16 RX ORDER — TEMAZEPAM 15 MG/1
CAPSULE ORAL
Qty: 30 CAP | Refills: 3 | Status: SHIPPED | OUTPATIENT
Start: 2020-03-16 | End: 2020-07-15

## 2020-04-07 DIAGNOSIS — I10 ESSENTIAL HYPERTENSION: ICD-10-CM

## 2020-04-07 RX ORDER — LISINOPRIL AND HYDROCHLOROTHIAZIDE 12.5; 2 MG/1; MG/1
TABLET ORAL
Qty: 180 TAB | Refills: 0 | Status: SHIPPED | OUTPATIENT
Start: 2020-04-07 | End: 2020-05-06 | Stop reason: SDUPTHER

## 2020-04-15 ENCOUNTER — VIRTUAL VISIT (OUTPATIENT)
Dept: INTERNAL MEDICINE CLINIC | Age: 85
End: 2020-04-15

## 2020-04-15 DIAGNOSIS — E78.00 PURE HYPERCHOLESTEROLEMIA: ICD-10-CM

## 2020-04-15 DIAGNOSIS — F51.01 PRIMARY INSOMNIA: ICD-10-CM

## 2020-04-15 DIAGNOSIS — E11.9 CONTROLLED TYPE 2 DIABETES MELLITUS WITHOUT COMPLICATION, WITHOUT LONG-TERM CURRENT USE OF INSULIN (HCC): Primary | ICD-10-CM

## 2020-04-15 DIAGNOSIS — I10 ESSENTIAL HYPERTENSION: ICD-10-CM

## 2020-04-15 NOTE — PROGRESS NOTES
HISTORY OF PRESENT ILLNESS  Mario Solorzano is a 80 y.o. female. HPI     Mario Solorzano is a 80 y.o. female evaluated via telephone on 4/15/2020. Consent:  She and/or health care decision maker is aware that that she may receive a bill for this telephone service, depending on her insurance coverage, and has provided verbal consent to proceed: Yes      Documentation:  I communicated with the patient and/or health care decision maker about medical care. Details of this discussion including any medical advice provided: see below      I affirm this is a Patient Initiated Episode with an Established Patient who has not had a related appointment within my department in the past 7 days or scheduled within the next 24 hours. Total Time: minutes: 11-20 minutes    Note: not billable if this call serves to triage the patient into an appointment for the relevant concern      Inna Rick MD   F/u DM-2 HTN  HLD, mild Carotid artery stenosis, chronic insomnia     Last a1c 6.4 LDL 73  fsbs 100-112  fasting  Home bp 140/80 today  No cp sob or neuropathy in her feet  Takes restoril qhs for insomnia which is effective per pt    Last OV       Last OV  Last a1c 6.6. LDL 73  Fell 3 times since last OV when reaching for things but no injury.  Feels steady walking  fsbs around 80-90's  Goes to [facility] clinic and will get flu shot then     Reports forgetfullness and started taking prevagen this year.       Patient Active Problem List    Diagnosis Date Noted    Osteopenia of spine 03/08/2018    Bilateral carotid artery stenosis 05/16/2016    Chest pain, unspecified 07/17/2012    Insomnia 01/12/2010    Family history of breast cancer in first degree relative 01/12/2010    Type 2 diabetes mellitus without complication ([facility] 24/07/0009    Essential hypertension, benign 06/29/2009    Pure hypercholesterolemia 06/29/2009     Current Outpatient Medications   Medication Sig Dispense Refill    metFORMIN (GLUCOPHAGE) 500 mg tablet TAKE 1 TABLET BY MOUTH IN THE MORNING AND 2 IN THE EVENING 270 Tab 3    lisinopril-hydroCHLOROthiazide (PRINZIDE, ZESTORETIC) 20-12.5 mg per tablet Take 1 tablet by mouth twice daily 180 Tab 0    temazepam (RESTORIL) 15 mg capsule TAKE 1 CAPSULE BY MOUTH AT BEDTIME AS NEEDED FOR SLEEP 30 Cap 3    atorvastatin (LIPITOR) 20 mg tablet TAKE 1 TABLET BY MOUTH EVERY OTHER DAY 30 Tab 11    lancets (FREESTYLE LANCETS) 28 gauge misc USE 1  TO CHECK GLUCOSE ONCE DAILY 100 Lancet 11    hydrALAZINE (APRESOLINE) 25 mg tablet TAKE 1 TABLET BY MOUTH THREE TIMES DAILY 90 Tab 11    FREESTYLE LITE STRIPS strip USE 1 STRIP TO CHECK GLUCOSE ONCE DAILY 100 Strip 11    metoprolol succinate (TOPROL-XL) 100 mg tablet TAKE 1 TABLET BY MOUTH ONCE DAILY 30 Tab 11    amLODIPine (NORVASC) 10 mg tablet TAKE 1 TABLET BY MOUTH ONCE DAILY 90 Tab 3    ibuprofen (MOTRIN) 400 mg tablet Take 1 Tab by mouth every eight (8) hours as needed for Pain. 30 Tab 5    Cholecalciferol, Vitamin D3, (VITAMIN D3) 1,000 unit Cap Take  by mouth.  Omega-3-DHA-EPA-Fish Oil 1,000 (120-180) mg Cap Take  by mouth.  aspirin 81 mg Tab Take  by mouth daily.  methocarbamol (ROBAXIN) 500 mg tablet Take 1 Tab by mouth every twelve (12) hours.  20 Tab 0     No Known Allergies   Lab Results   Component Value Date/Time    WBC 3.1 (L) 10/23/2019 09:39 AM    HGB 11.7 10/23/2019 09:39 AM    HCT 34.8 10/23/2019 09:39 AM    PLATELET 309 50/88/6493 09:39 AM    MCV 86 10/23/2019 09:39 AM     Lab Results   Component Value Date/Time    Hemoglobin A1c 6.4 (H) 10/23/2019 09:39 AM    Hemoglobin A1c 6.6 (H) 04/23/2019 10:00 AM    Hemoglobin A1c 6.8 (H) 10/23/2018 09:45 AM    Hemoglobin A1c, External 7.0 on 10/02/2014 Dr. Rashi Solis 10/02/2014    Hemoglobin A1c, External 6.6 on 10/2/14 Dr. Rashi Solis 10/02/2014    Glucose 108 (H) 10/23/2019 09:39 AM    Glucose (POC) 113 (H) 07/25/2012 01:49 PM    Microalb/Creat ratio (ug/mg creat.) 15.3 10/23/2019 09:44 AM    LDL, calculated 73 10/23/2019 09:39 AM    Creatinine 0.85 10/23/2019 09:39 AM      Lab Results   Component Value Date/Time    Cholesterol, total 150 10/23/2019 09:39 AM    Cholesterol (POC) 160 01/28/2011 08:38 AM    HDL Cholesterol 65 10/23/2019 09:39 AM    LDL, calculated 73 10/23/2019 09:39 AM    LDL Cholesterol (POC) 45 01/28/2011 08:38 AM    LDL-C, External 71 on 10/02/14 Dr. Fletcher Tejada 10/02/2014    Triglyceride 61 10/23/2019 09:39 AM    Triglycerides (POC) 281 01/28/2011 08:38 AM    CHOL/HDL Ratio 2.2 07/30/2010 09:21 AM     Lab Results   Component Value Date/Time    GFR est non-AA 64 10/23/2019 09:39 AM    GFR est AA 73 10/23/2019 09:39 AM    Creatinine 0.85 10/23/2019 09:39 AM    BUN 14 10/23/2019 09:39 AM    Sodium 139 10/23/2019 09:39 AM    Potassium 3.7 10/23/2019 09:39 AM    Chloride 95 (L) 10/23/2019 09:39 AM    CO2 25 10/23/2019 09:39 AM        ROS    Physical Exam    ASSESSMENT and PLAN  Diagnoses and all orders for this visit:    1. Controlled type 2 diabetes mellitus without complication, without long-term current use of insulin (HCC)  -     METABOLIC PANEL, COMPREHENSIVE  -     HEMOGLOBIN A1C WITH EAG   Controlled per home readings  2. Essential hypertension  -     METABOLIC PANEL, COMPREHENSIVE   continue medicines  3. Pure hypercholesterolemia  -     METABOLIC PANEL, COMPREHENSIVE   LDL at goal on statin  4.  Primary insomnia   refill restoril when needed

## 2020-04-15 NOTE — PATIENT INSTRUCTIONS
Andre Johnson is a 80 y.o. female evaluated via telephone on 4/15/2020. Consent: 
She and/or health care decision maker is aware that that she may receive a bill for this telephone service, depending on her insurance coverage, and has provided verbal consent to proceed:  Yes

## 2020-04-15 NOTE — PROGRESS NOTES
Chief Complaint   Patient presents with    Diabetes     6 month follow up    Hypertension      6 month follow up  (140/80 ) today's reading per patient @ home    Cholesterol Problem     6 month follow up   [facility] lab orders with map

## 2020-05-06 DIAGNOSIS — I10 ESSENTIAL HYPERTENSION: ICD-10-CM

## 2020-05-06 DIAGNOSIS — E11.9 TYPE 2 DIABETES MELLITUS WITHOUT COMPLICATION, WITHOUT LONG-TERM CURRENT USE OF INSULIN (HCC): ICD-10-CM

## 2020-05-06 RX ORDER — ATORVASTATIN CALCIUM 20 MG/1
20 TABLET, FILM COATED ORAL DAILY
Qty: 90 TAB | Refills: 3 | Status: SHIPPED | OUTPATIENT
Start: 2020-05-06 | End: 2020-11-09 | Stop reason: SDUPTHER

## 2020-05-06 RX ORDER — AMLODIPINE BESYLATE 10 MG/1
10 TABLET ORAL DAILY
Qty: 90 TAB | Refills: 3 | Status: SHIPPED | OUTPATIENT
Start: 2020-05-06 | End: 2020-11-09 | Stop reason: SDUPTHER

## 2020-05-06 RX ORDER — LISINOPRIL AND HYDROCHLOROTHIAZIDE 12.5; 2 MG/1; MG/1
1 TABLET ORAL 2 TIMES DAILY
Qty: 180 TAB | Refills: 3 | Status: SHIPPED | OUTPATIENT
Start: 2020-05-06 | End: 2020-11-09 | Stop reason: SDUPTHER

## 2020-05-06 RX ORDER — HYDRALAZINE HYDROCHLORIDE 25 MG/1
25 TABLET, FILM COATED ORAL DAILY
Qty: 90 TAB | Refills: 3 | Status: SHIPPED | OUTPATIENT
Start: 2020-05-06 | End: 2020-11-09 | Stop reason: SDUPTHER

## 2020-05-06 RX ORDER — METOPROLOL SUCCINATE 100 MG/1
100 TABLET, EXTENDED RELEASE ORAL DAILY
Qty: 90 TAB | Refills: 3 | Status: SHIPPED | OUTPATIENT
Start: 2020-05-06 | End: 2020-11-09 | Stop reason: SDUPTHER

## 2020-05-06 NOTE — TELEPHONE ENCOUNTER
PCP: Stefanie Tinoco MD    Last appt: 4/15/2020  Future Appointments   Date Time Provider Maria Del Rosario English   10/19/2020 10:30 AM Stefanie Tinoco MD [facility]       Requested Prescriptions     Pending Prescriptions Disp Refills    atorvastatin (LIPITOR) 20 mg tablet 90 Tab 3     Sig: Take 1 Tab by mouth daily.  hydrALAZINE (APRESOLINE) 25 mg tablet 90 Tab 3     Sig: Take 1 Tab by mouth daily.  metoprolol succinate (TOPROL-XL) 100 mg tablet 90 Tab 3     Sig: Take 1 Tab by mouth daily.  lisinopril-hydroCHLOROthiazide (PRINZIDE, ZESTORETIC) 20-12.5 mg per tablet 180 Tab 3     Sig: Take 1 Tab by mouth two (2) times a day.  glucose blood VI test strips (FreeStyle Lite Strips) strip 100 Strip 3     Sig: by Does Not Apply route daily.  amLODIPine (NORVASC) 10 mg tablet 90 Tab 3     Sig: Take 1 Tab by mouth daily.

## 2020-05-07 RX ORDER — LANCETS 28 GAUGE
EACH MISCELLANEOUS
Qty: 100 LANCET | Refills: 11 | Status: SHIPPED | OUTPATIENT
Start: 2020-05-07 | End: 2020-11-09 | Stop reason: SDUPTHER

## 2020-05-07 RX ORDER — METFORMIN HYDROCHLORIDE 500 MG/1
TABLET ORAL
Qty: 270 TAB | Refills: 3 | Status: SHIPPED | OUTPATIENT
Start: 2020-05-07 | End: 2020-11-09 | Stop reason: SDUPTHER

## 2020-05-07 NOTE — TELEPHONE ENCOUNTER
PCP: Michelle Albarado MD    Last appt: 4/15/2020  Future Appointments   Date Time Provider Maria Del Rosario Albrechti   10/19/2020 10:30 AM Michelle Albarado MD [facility] 87       Requested Prescriptions     Pending Prescriptions Disp Refills    metFORMIN (GLUCOPHAGE) 500 mg tablet 270 Tab 3    lancets (FreeStyle Lancets) 28 gauge misc 100 Lancet 11     Sig: USE 1  TO CHECK GLUCOSE ONCE DAILY

## 2020-10-17 NOTE — PROGRESS NOTES
HISTORY OF PRESENT ILLNESS  Tyler Lee is a 80 y.o. female. HPI      F/u DM-2 HTN  HLD, mild Carotid artery stenosis, chronic insomnia, hx osteopenia with low frax and medicare wellness----  Due for labs  fsbs--around 110   Cp sob neuropathy-  She reports having difficulty with memory-lives alone, independent for several years but getting worse    Last OV  Last a1c 6.4 LDL 73  fsbs 100-112  fasting  Home bp 140/80 today  No cp sob or neuropathy in her feet  Takes restoril qhs for insomnia which is effective per pt       Patient Active Problem List    Diagnosis Date Noted    Osteopenia of spine 03/08/2018    Bilateral carotid artery stenosis 05/16/2016    Chest pain, unspecified 07/17/2012    Insomnia 01/12/2010    Family history of breast cancer in first degree relative 01/12/2010    Type 2 diabetes mellitus without complication ([facility] [address] 21/31/0335    Essential hypertension, benign 06/29/2009    Pure hypercholesterolemia 06/29/2009     Current Outpatient Medications   Medication Sig Dispense Refill    temazepam (RESTORIL) 15 mg capsule TAKE 1 CAPSULE BY MOUTH AT BEDTIME AS NEEDED FOR  SLEEP 30 Cap 5    metFORMIN (GLUCOPHAGE) 500 mg tablet One qam, 2 qpm 270 Tab 3    lancets (FreeStyle Lancets) 28 gauge misc USE 1  TO CHECK GLUCOSE ONCE DAILY 100 Lancet 11    atorvastatin (LIPITOR) 20 mg tablet Take 1 Tab by mouth daily. 90 Tab 3    hydrALAZINE (APRESOLINE) 25 mg tablet Take 1 Tab by mouth daily. 90 Tab 3    metoprolol succinate (TOPROL-XL) 100 mg tablet Take 1 Tab by mouth daily. 90 Tab 3    lisinopril-hydroCHLOROthiazide (PRINZIDE, ZESTORETIC) 20-12.5 mg per tablet Take 1 Tab by mouth two (2) times a day. 180 Tab 3    glucose blood VI test strips (FreeStyle Lite Strips) strip by Does Not Apply route daily. 100 Strip 3    amLODIPine (NORVASC) 10 mg tablet Take 1 Tab by mouth daily. 90 Tab 3    ibuprofen (MOTRIN) 400 mg tablet Take 1 Tab by mouth every eight (8) hours as needed for Pain.  30 Tab 5    methocarbamol (ROBAXIN) 500 mg tablet Take 1 Tab by mouth every twelve (12) hours. 20 Tab 0    Cholecalciferol, Vitamin D3, (VITAMIN D3) 1,000 unit Cap Take  by mouth.  Omega-3-DHA-EPA-Fish Oil 1,000 (120-180) mg Cap Take  by mouth.  aspirin 81 mg Tab Take  by mouth daily. No Known Allergies   Lab Results   Component Value Date/Time    Hemoglobin A1c 6.4 (H) 10/23/2019 09:39 AM    Hemoglobin A1c 6.6 (H) 04/23/2019 10:00 AM    Hemoglobin A1c 6.8 (H) 10/23/2018 09:45 AM    Hemoglobin A1c, External 7.0 on 10/02/2014 Dr. Jonathan Garcia 10/02/2014    Hemoglobin A1c, External 6.6 on 10/2/14 Dr. Jonathan Garcia 10/02/2014    Glucose 108 (H) 10/23/2019 09:39 AM    Glucose (POC) 113 (H) 07/25/2012 01:49 PM    Microalb/Creat ratio (ug/mg creat.) 15.3 10/23/2019 09:44 AM    LDL, calculated 73 10/23/2019 09:39 AM    Creatinine 0.85 10/23/2019 09:39 AM      Lab Results   Component Value Date/Time    Cholesterol, total 150 10/23/2019 09:39 AM    Cholesterol (POC) 160 01/28/2011 08:38 AM    HDL Cholesterol 65 10/23/2019 09:39 AM    LDL, calculated 73 10/23/2019 09:39 AM    LDL Cholesterol (POC) 45 01/28/2011 08:38 AM    LDL-C, External 71 on 10/02/14 Dr. Jonathan Garcia 10/02/2014    Triglyceride 61 10/23/2019 09:39 AM    Triglycerides (POC) 281 01/28/2011 08:38 AM    CHOL/HDL Ratio 2.2 07/30/2010 09:21 AM     Lab Results   Component Value Date/Time    GFR est non-AA 64 10/23/2019 09:39 AM    GFR est AA 73 10/23/2019 09:39 AM    Creatinine 0.85 10/23/2019 09:39 AM    BUN 14 10/23/2019 09:39 AM    Sodium 139 10/23/2019 09:39 AM    Potassium 3.7 10/23/2019 09:39 AM    Chloride 95 (L) 10/23/2019 09:39 AM    CO2 25 10/23/2019 09:39 AM        ROS    Physical Exam  Vitals signs and nursing note reviewed. Constitutional:       Appearance: She is well-developed. Comments: Appears stated age   Cardiovascular:      Rate and Rhythm: Normal rate and regular rhythm.       Heart sounds: Normal heart sounds. No murmur. No friction rub. No gallop. Pulmonary:      Effort: Pulmonary effort is normal. No respiratory distress. Breath sounds: Normal breath sounds. No wheezing. Abdominal:      General: Bowel sounds are normal.      Palpations: Abdomen is soft. Neurological:      Mental Status: She is alert. ASSESSMENT and PLAN  Diagnoses and all orders for this visit:    1. Controlled type 2 diabetes mellitus without complication, without long-term current use of insulin (HCC)  -     METABOLIC PANEL, COMPREHENSIVE  -     HEMOGLOBIN A1C WITH EAG  -     HM DIABETES FOOT EXAM  -     MICROALBUMIN, UR, RAND W/ MICROALB/CREAT RATIO    2. Essential hypertension  -     CBC W/O DIFF  -     METABOLIC PANEL, COMPREHENSIVE    3. Pure hypercholesterolemia  -     METABOLIC PANEL, COMPREHENSIVE  -     LIPID PANEL    4. Insomnia, unspecified type    5. Needs flu shot  -     FLU (FLUAD QUAD INFLUENZA VACCINE,QUAD,ADJUVANTED)           This is the Subsequent Medicare Annual Wellness Exam, performed 12 months or more after the Initial AWV or the last Subsequent AWV    I have reviewed the patient's medical history in detail and updated the computerized patient record.      History     Patient Active Problem List   Diagnosis Code    Type 2 diabetes mellitus without complication (HCC) C47.0    Essential hypertension, benign I10    Pure hypercholesterolemia E78.00    Insomnia G47.00    Family history of breast cancer in first degree relative Z80.3    Chest pain, unspecified R07.9    Bilateral carotid artery stenosis I65.23    Osteopenia of spine M85.88     Past Medical History:   Diagnosis Date    Arrhythmia     Arthritis     Bilateral cataracts     Chest pain     Diabetes ([facility] [address]     Essential hypertension     Hypercholesteremia     Hypertension       Past Surgical History:   Procedure Laterality Date    HX GYN      D & C    HX ORTHOPAEDIC      Carpal tunnel release right wrist     Current Outpatient Medications   Medication Sig Dispense Refill    temazepam (RESTORIL) 15 mg capsule TAKE 1 CAPSULE BY MOUTH AT BEDTIME AS NEEDED FOR  SLEEP 30 Cap 5    metFORMIN (GLUCOPHAGE) 500 mg tablet One qam, 2 qpm 270 Tab 3    lancets (FreeStyle Lancets) 28 gauge misc USE 1  TO CHECK GLUCOSE ONCE DAILY 100 Lancet 11    atorvastatin (LIPITOR) 20 mg tablet Take 1 Tab by mouth daily. 90 Tab 3    hydrALAZINE (APRESOLINE) 25 mg tablet Take 1 Tab by mouth daily. 90 Tab 3    metoprolol succinate (TOPROL-XL) 100 mg tablet Take 1 Tab by mouth daily. 90 Tab 3    lisinopril-hydroCHLOROthiazide (PRINZIDE, ZESTORETIC) 20-12.5 mg per tablet Take 1 Tab by mouth two (2) times a day. 180 Tab 3    glucose blood VI test strips (FreeStyle Lite Strips) strip by Does Not Apply route daily. 100 Strip 3    amLODIPine (NORVASC) 10 mg tablet Take 1 Tab by mouth daily. 90 Tab 3    ibuprofen (MOTRIN) 400 mg tablet Take 1 Tab by mouth every eight (8) hours as needed for Pain. 30 Tab 5    Cholecalciferol, Vitamin D3, (VITAMIN D3) 1,000 unit Cap Take  by mouth.  Omega-3-DHA-EPA-Fish Oil 1,000 (120-180) mg Cap Take  by mouth.  aspirin 81 mg Tab Take  by mouth daily.  methocarbamol (ROBAXIN) 500 mg tablet Take 1 Tab by mouth every twelve (12) hours.  21 Tab 0     No Known Allergies    Family History   Problem Relation Age of Onset    Diabetes Mother     Heart Disease Father     Hypertension Father     Heart Disease Brother     Cancer Sister         breast    Cancer Brother         lung    Cancer Brother         lung    Diabetes Brother     Diabetes Brother     Other Brother         spinal meningitis    Diabetes Sister         leg amputation     Social History     Tobacco Use    Smoking status: Never Smoker    Smokeless tobacco: Never Used   Substance Use Topics    Alcohol use: No       Depression Risk Factor Screening:     3 most recent PHQ Screens 10/19/2020   Little interest or pleasure in doing things Not at all Feeling down, depressed, irritable, or hopeless Not at all   Total Score PHQ 2 0       Alcohol Risk Screen   Do you average more than 1 drink per night or more than 7 drinks a week:  No    On any one occasion in the past three months have you have had more than 3 drinks containing alcohol:  No        Functional Ability and Level of Safety:   Hearing: The patient wears hearing aids. Activities of Daily Living: The home contains: grab bars  Patient does total self care     Ambulation: with no difficulty     Fall Risk:  Fall Risk Assessment, last 12 mths 10/19/2020   Able to walk? Yes   Fall in past 12 months? No   Fall with injury? -   Number of falls in past 12 months -   Fall Risk Score -     Abuse Screen:  Patient is not abused       Cognitive Screening   Has your family/caregiver stated any concerns about your memory: no     Cognitive Screening:- MMSE (Mini Mental Status Exam)   15/30    Patient Care Team   Patient Care Team:  Casi Arnold MD as PCP - Duncan Elder MD as PCP - [facility] Empaneled Provider  Maria Fernanda Finch MD (Ophthalmology)  Sadie Jeffery MD (Cardiology)  Tre Craig MD (Orthopedic Surgery)    Assessment/Plan   Education and counseling provided:  Are appropriate based on today's review and evaluation  tdap and shingrix recommended    Diagnoses and all orders for this visit:    1. Controlled type 2 diabetes mellitus without complication, without long-term current use of insulin (HCC)  -     METABOLIC PANEL, COMPREHENSIVE  -     HEMOGLOBIN A1C WITH EAG  -     HM DIABETES FOOT EXAM  -     MICROALBUMIN, UR, RAND W/ MICROALB/CREAT RATIO   Controlled on metformin  2. Essential hypertension  -     CBC W/O DIFF  -     METABOLIC PANEL, COMPREHENSIVE   controlled  3. Pure hypercholesterolemia  -     METABOLIC PANEL, COMPREHENSIVE  -     LIPID PANEL   On statin  4. Insomnia, unspecified type   Refill temazepam  5.  Needs flu shot  -     FLU (FLUAD QUAD INFLUENZA VACCINE,QUAD,ADJUVANTED)    6 Memory loss   [facility] 15/30   Refer to neuro MD    Health Maintenance Due   Topic Date Due    DTaP/Tdap/Td series (1 - Tdap) 11/20/1956    Shingrix Vaccine Age 50> (1 of 2) 11/20/1985    Foot Exam Q1  04/23/2020    A1C test (Diabetic or Prediabetic)  04/23/2020    Flu Vaccine (1) 09/01/2020    Medicare Yearly Exam  10/23/2020    MICROALBUMIN Q1  10/23/2020    Lipid Screen  10/23/2020

## 2020-10-19 ENCOUNTER — OFFICE VISIT (OUTPATIENT)
Dept: INTERNAL MEDICINE CLINIC | Age: 85
End: 2020-10-19
Payer: MEDICARE

## 2020-10-19 VITALS
HEART RATE: 58 BPM | DIASTOLIC BLOOD PRESSURE: 60 MMHG | HEIGHT: 64 IN | OXYGEN SATURATION: 99 % | BODY MASS INDEX: 21.85 KG/M2 | WEIGHT: 128 LBS | TEMPERATURE: 97 F | RESPIRATION RATE: 16 BRPM | SYSTOLIC BLOOD PRESSURE: 128 MMHG

## 2020-10-19 DIAGNOSIS — I10 ESSENTIAL HYPERTENSION: ICD-10-CM

## 2020-10-19 DIAGNOSIS — E11.51 TYPE 2 DIABETES MELLITUS WITH PERIPHERAL VASCULAR DISEASE (HCC): ICD-10-CM

## 2020-10-19 DIAGNOSIS — Z00.00 MEDICARE ANNUAL WELLNESS VISIT, SUBSEQUENT: ICD-10-CM

## 2020-10-19 DIAGNOSIS — E78.00 PURE HYPERCHOLESTEROLEMIA: ICD-10-CM

## 2020-10-19 DIAGNOSIS — G47.00 INSOMNIA, UNSPECIFIED TYPE: ICD-10-CM

## 2020-10-19 DIAGNOSIS — E11.9 CONTROLLED TYPE 2 DIABETES MELLITUS WITHOUT COMPLICATION, WITHOUT LONG-TERM CURRENT USE OF INSULIN (HCC): Primary | ICD-10-CM

## 2020-10-19 DIAGNOSIS — R41.3 MEMORY LOSS: ICD-10-CM

## 2020-10-19 DIAGNOSIS — Z23 NEEDS FLU SHOT: ICD-10-CM

## 2020-10-19 PROCEDURE — 99214 OFFICE O/P EST MOD 30 MIN: CPT | Performed by: INTERNAL MEDICINE

## 2020-10-19 PROCEDURE — 1101F PT FALLS ASSESS-DOCD LE1/YR: CPT | Performed by: INTERNAL MEDICINE

## 2020-10-19 PROCEDURE — G0439 PPPS, SUBSEQ VISIT: HCPCS | Performed by: INTERNAL MEDICINE

## 2020-10-19 PROCEDURE — G8752 SYS BP LESS 140: HCPCS | Performed by: INTERNAL MEDICINE

## 2020-10-19 PROCEDURE — G8420 CALC BMI NORM PARAMETERS: HCPCS | Performed by: INTERNAL MEDICINE

## 2020-10-19 PROCEDURE — 1090F PRES/ABSN URINE INCON ASSESS: CPT | Performed by: INTERNAL MEDICINE

## 2020-10-19 PROCEDURE — G8510 SCR DEP NEG, NO PLAN REQD: HCPCS | Performed by: INTERNAL MEDICINE

## 2020-10-19 PROCEDURE — G8754 DIAS BP LESS 90: HCPCS | Performed by: INTERNAL MEDICINE

## 2020-10-19 PROCEDURE — G0008 ADMIN INFLUENZA VIRUS VAC: HCPCS | Performed by: INTERNAL MEDICINE

## 2020-10-19 PROCEDURE — G8536 NO DOC ELDER MAL SCRN: HCPCS | Performed by: INTERNAL MEDICINE

## 2020-10-19 PROCEDURE — 90694 VACC AIIV4 NO PRSRV 0.5ML IM: CPT | Performed by: INTERNAL MEDICINE

## 2020-10-19 PROCEDURE — G8427 DOCREV CUR MEDS BY ELIG CLIN: HCPCS | Performed by: INTERNAL MEDICINE

## 2020-10-19 PROCEDURE — G8399 PT W/DXA RESULTS DOCUMENT: HCPCS | Performed by: INTERNAL MEDICINE

## 2020-10-19 NOTE — PATIENT INSTRUCTIONS
1. Have you been to the ER, urgent care clinic since your last visit? Hospitalized since your last visit? NO 
 
2. Have you seen or consulted any other health care providers outside of the [address] [place] [address] since your last visit? Include any pap smears or colon screening.  NO

## 2020-10-19 NOTE — PROGRESS NOTES
Marlen Christianson is a 80 y.o. female who presents for routine immunizations. She denies any symptoms , reactions or allergies that would exclude them from being immunized today. Risks and adverse reactions were discussed and the VIS was given to them. All questions were addressed. She was observed for 10  min post injection. There were no reactions observed.     Lawanda Yin LPN

## 2020-10-20 LAB
ALBUMIN SERPL-MCNC: 5.1 G/DL (ref 3.6–4.6)
ALBUMIN/CREAT UR: 14 MG/G CREAT (ref 0–29)
ALBUMIN/GLOB SERPL: 1.9 {RATIO} (ref 1.2–2.2)
ALP SERPL-CCNC: 125 IU/L (ref 39–117)
ALT SERPL-CCNC: 8 IU/L (ref 0–32)
AST SERPL-CCNC: 23 IU/L (ref 0–40)
BILIRUB SERPL-MCNC: 0.2 MG/DL (ref 0–1.2)
BUN SERPL-MCNC: 12 MG/DL (ref 8–27)
BUN/CREAT SERPL: 16 (ref 12–28)
CALCIUM SERPL-MCNC: 10.1 MG/DL (ref 8.7–10.3)
CHLORIDE SERPL-SCNC: 93 MMOL/L (ref 96–106)
CHOLEST SERPL-MCNC: 183 MG/DL (ref 100–199)
CO2 SERPL-SCNC: 21 MMOL/L (ref 20–29)
CREAT SERPL-MCNC: 0.76 MG/DL (ref 0.57–1)
CREAT UR-MCNC: 90.7 MG/DL
ERYTHROCYTE [DISTWIDTH] IN BLOOD BY AUTOMATED COUNT: 12.6 % (ref 11.7–15.4)
EST. AVERAGE GLUCOSE BLD GHB EST-MCNC: 140 MG/DL
GLOBULIN SER CALC-MCNC: 2.7 G/DL (ref 1.5–4.5)
GLUCOSE SERPL-MCNC: 123 MG/DL (ref 65–99)
HBA1C MFR BLD: 6.5 % (ref 4.8–5.6)
HCT VFR BLD AUTO: 38.2 % (ref 34–46.6)
HDLC SERPL-MCNC: 78 MG/DL
HGB BLD-MCNC: 12.5 G/DL (ref 11.1–15.9)
LDLC SERPL CALC-MCNC: 88 MG/DL (ref 0–99)
MCH RBC QN AUTO: 28.4 PG (ref 26.6–33)
MCHC RBC AUTO-ENTMCNC: 32.7 G/DL (ref 31.5–35.7)
MCV RBC AUTO: 87 FL (ref 79–97)
MICROALBUMIN UR-MCNC: 12.3 UG/ML
PLATELET # BLD AUTO: 318 X10E3/UL (ref 150–450)
POTASSIUM SERPL-SCNC: 4.2 MMOL/L (ref 3.5–5.2)
PROT SERPL-MCNC: 7.8 G/DL (ref 6–8.5)
RBC # BLD AUTO: 4.4 X10E6/UL (ref 3.77–5.28)
SODIUM SERPL-SCNC: 136 MMOL/L (ref 134–144)
TRIGL SERPL-MCNC: 94 MG/DL (ref 0–149)
VLDLC SERPL CALC-MCNC: 17 MG/DL (ref 5–40)
WBC # BLD AUTO: 6.4 X10E3/UL (ref 3.4–10.8)

## 2020-10-26 NOTE — PROGRESS NOTES
Tell pt normal blood cell counts lytes kidney liver  3 mos bs avg is 140-diabetes remains diet controlled  Lipids at goal on lipitor  Advise pt to see neurologist for memory loss -Dr Jenny Coreas or associate please

## 2020-10-27 ENCOUNTER — TELEPHONE (OUTPATIENT)
Dept: INTERNAL MEDICINE CLINIC | Age: 85
End: 2020-10-27

## 2020-10-27 NOTE — TELEPHONE ENCOUNTER
Patient requires a [employer] referral for appt with Sarah Jordan on 12/7/20    ----- Message from Stephanie Trotter sent at 10/27/2020  2:58 PM EDT -----  Regarding: Dr. Lorie Devine  Referral    Caller (first and last name if not the patient or from practice):      Caller's relationship to patient (if not from a practice):      Name of caller (if calling from a practice):  Maynor Edmondson      Name of practice: unknown       Specialist's title, first, and last name: Dr. Talita Gomez [phone]      Fax number:unknown      Date and time of appointment: 12/7/20  at 8:50am      Reason for appointment: Memory Loss      Message from Rickie

## 2020-10-28 NOTE — PROGRESS NOTES
Called, spoke to pt. Two identifiers confirmed. Notified pt of lab results/recommendations per Dr. Elbert Vera. Pt verbalized understanding of information discussed w/ no further questions at this time. Pt scheduled to see neuro 12/7.

## 2020-10-29 LAB — SPECIMEN STATUS REPORT, ROLRST: NORMAL

## 2020-11-04 ENCOUNTER — TELEPHONE (OUTPATIENT)
Dept: INTERNAL MEDICINE CLINIC | Age: 85
End: 2020-11-04

## 2020-11-04 NOTE — LETTER
2020 1:03 PM 
 
Ms. Charles Ngo : 1935 
[address] [facility] [id number] Current Outpatient Medications:  
  temazepam (RESTORIL) 15 mg capsule, TAKE 1 CAPSULE BY MOUTH AT BEDTIME AS NEEDED FOR  SLEEP, Disp: 30 Cap, Rfl: 5 
  metFORMIN (GLUCOPHAGE) 500 mg tablet, One qam, 2 qpm, Disp: 270 Tab, Rfl: 3 
  lancets (FreeStyle Lancets) 28 gauge misc, USE 1  TO CHECK GLUCOSE ONCE DAILY, Disp: 100 Lancet, Rfl: 11 
  atorvastatin (LIPITOR) 20 mg tablet, Take 1 Tab by mouth daily. , Disp: 90 Tab, Rfl: 3 
  hydrALAZINE (APRESOLINE) 25 mg tablet, Take 1 Tab by mouth daily. , Disp: 90 Tab, Rfl: 3 
  metoprolol succinate (TOPROL-XL) 100 mg tablet, Take 1 Tab by mouth daily. , Disp: 90 Tab, Rfl: 3 
  lisinopril-hydroCHLOROthiazide (PRINZIDE, ZESTORETIC) 20-12.5 mg per tablet, Take 1 Tab by mouth two (2) times a day., Disp: 180 Tab, Rfl: 3 
  glucose blood VI test strips (FreeStyle Lite Strips) strip, by Does Not Apply route daily. , Disp: 100 Strip, Rfl: 3 
  amLODIPine (NORVASC) 10 mg tablet, Take 1 Tab by mouth daily. , Disp: 90 Tab, Rfl: 3 
  ibuprofen (MOTRIN) 400 mg tablet, Take 1 Tab by mouth every eight (8) hours as needed for Pain., Disp: 30 Tab, Rfl: 5 
  methocarbamol (ROBAXIN) 500 mg tablet, Take 1 Tab by mouth every twelve (12) hours. , Disp: 20 Tab, Rfl: 0 
  Cholecalciferol, Vitamin D3, (VITAMIN D3) 1,000 unit Cap, Take  by mouth.  , Disp: , Rfl:  
  Omega-3-DHA-EPA-Fish Oil 1,000 (120-180) mg Cap, Take  by mouth., Disp: , Rfl:  
  aspirin 81 mg Tab, Take  by mouth daily. , Disp: , Rfl:

## 2020-11-04 NOTE — TELEPHONE ENCOUNTER
----- Message from [employer] sent at 11/4/2020  4:20 PM EST -----  Regarding: /Telephone  Contact: [phone]  General Message/Vendor Calls    Caller's first and last name:Rosalind from [address]       Reason for call:med list       Callback required yes/no and why:yes to confirm that updated med list was faxed      Best contact number(s):[phone]      Details to clarify the request:[facility] needing an updated med list for pt to be faxed at [phone]      Message from Karon Klein

## 2020-11-09 DIAGNOSIS — I10 ESSENTIAL HYPERTENSION: ICD-10-CM

## 2020-11-09 DIAGNOSIS — E11.9 TYPE 2 DIABETES MELLITUS WITHOUT COMPLICATION, WITHOUT LONG-TERM CURRENT USE OF INSULIN (HCC): ICD-10-CM

## 2020-11-09 DIAGNOSIS — F51.01 PRIMARY INSOMNIA: ICD-10-CM

## 2020-11-09 RX ORDER — IBUPROFEN 400 MG/1
400 TABLET ORAL
Qty: 90 TAB | Refills: 3 | Status: SHIPPED | OUTPATIENT
Start: 2020-11-09 | End: 2021-09-03 | Stop reason: SDUPTHER

## 2020-11-09 RX ORDER — METOPROLOL SUCCINATE 100 MG/1
100 TABLET, EXTENDED RELEASE ORAL DAILY
Qty: 90 TAB | Refills: 3 | Status: SHIPPED | OUTPATIENT
Start: 2020-11-09 | End: 2021-09-03 | Stop reason: SDUPTHER

## 2020-11-09 RX ORDER — BLOOD-GLUCOSE METER
KIT MISCELLANEOUS DAILY
Qty: 300 STRIP | Refills: 3 | Status: SHIPPED | OUTPATIENT
Start: 2020-11-09

## 2020-11-09 RX ORDER — METFORMIN HYDROCHLORIDE 500 MG/1
TABLET ORAL
Qty: 270 TAB | Refills: 3 | Status: SHIPPED | OUTPATIENT
Start: 2020-11-09 | End: 2021-09-03 | Stop reason: SDUPTHER

## 2020-11-09 RX ORDER — HYDRALAZINE HYDROCHLORIDE 25 MG/1
25 TABLET, FILM COATED ORAL DAILY
Qty: 90 TAB | Refills: 3 | Status: SHIPPED | OUTPATIENT
Start: 2020-11-09 | End: 2021-02-05

## 2020-11-09 RX ORDER — TEMAZEPAM 15 MG/1
CAPSULE ORAL
Qty: 90 CAP | Refills: 3 | Status: SHIPPED | OUTPATIENT
Start: 2020-11-09 | End: 2020-11-09

## 2020-11-09 RX ORDER — LISINOPRIL AND HYDROCHLOROTHIAZIDE 12.5; 2 MG/1; MG/1
1 TABLET ORAL 2 TIMES DAILY
Qty: 180 TAB | Refills: 3 | Status: SHIPPED | OUTPATIENT
Start: 2020-11-09 | End: 2021-02-05

## 2020-11-09 RX ORDER — LANCETS 28 GAUGE
EACH MISCELLANEOUS
Qty: 300 LANCET | Refills: 3 | Status: SHIPPED | OUTPATIENT
Start: 2020-11-09 | End: 2021-09-03 | Stop reason: SDUPTHER

## 2020-11-09 RX ORDER — AMLODIPINE BESYLATE 10 MG/1
10 TABLET ORAL DAILY
Qty: 90 TAB | Refills: 3 | Status: SHIPPED | OUTPATIENT
Start: 2020-11-09 | End: 2021-09-03 | Stop reason: SDUPTHER

## 2020-11-09 RX ORDER — ATORVASTATIN CALCIUM 20 MG/1
20 TABLET, FILM COATED ORAL DAILY
Qty: 90 TAB | Refills: 3 | Status: SHIPPED | OUTPATIENT
Start: 2020-11-09 | End: 2021-09-03 | Stop reason: SDUPTHER

## 2020-11-09 NOTE — TELEPHONE ENCOUNTER
#[phone] pt is transitioning to [facility]. What they need to have is new scripts sent to them on all of medications that Dr. Elbert Vera prescribes as pt will be getting pill packs from now on.     Any questions, please call [facility]

## 2020-11-09 NOTE — TELEPHONE ENCOUNTER
Future Appointments:  Future Appointments   Date Time Provider Maria Del Rosario English   12/7/2020  9:20 AM MD JAMES Crowley BS AMB   4/26/2021 11:00 AM Norma Rivas MD [facility] BS AMB        Last Appointment With Me:  10/19/2020     Requested Prescriptions     Pending Prescriptions Disp Refills    amLODIPine (NORVASC) 10 mg tablet 90 Tab 3     Sig: Take 1 Tab by mouth daily.  atorvastatin (LIPITOR) 20 mg tablet 90 Tab 3     Sig: Take 1 Tab by mouth daily.  glucose blood VI test strips (FreeStyle Lite Strips) strip 300 Strip 3     Sig: by Does Not Apply route daily.  lancets (FreeStyle Lancets) 28 gauge misc 300 Lancet 3     Sig: USE 1  TO CHECK GLUCOSE ONCE DAILY    lisinopril-hydroCHLOROthiazide (PRINZIDE, ZESTORETIC) 20-12.5 mg per tablet 180 Tab 3     Sig: Take 1 Tab by mouth two (2) times a day.  metFORMIN (GLUCOPHAGE) 500 mg tablet 270 Tab 3     Sig: One qam, 2 qpm    metoprolol succinate (TOPROL-XL) 100 mg tablet 90 Tab 3     Sig: Take 1 Tab by mouth daily.  temazepam (RESTORIL) 15 mg capsule 90 Cap 3     Sig: TAKE 1 CAPSULE BY MOUTH AT BEDTIME AS NEEDED FOR  SLEEP    ibuprofen (MOTRIN) 400 mg tablet 90 Tab 3     Sig: Take 1 Tab by mouth every eight (8) hours as needed for Pain.  hydrALAZINE (APRESOLINE) 25 mg tablet 90 Tab 3     Sig: Take 1 Tab by mouth daily.

## 2020-11-16 RX ORDER — GLUCOSAM/CHONDRO/HERB 149/HYAL 750-100 MG
1 TABLET ORAL DAILY
Qty: 90 CAP | Refills: 3 | Status: SHIPPED | OUTPATIENT
Start: 2020-11-16 | End: 2021-09-03 | Stop reason: SDUPTHER

## 2020-11-16 RX ORDER — ASPIRIN 81 MG/1
81 TABLET ORAL DAILY
Qty: 90 TAB | Refills: 3 | Status: SHIPPED | OUTPATIENT
Start: 2020-11-16 | End: 2021-09-03 | Stop reason: SDUPTHER

## 2020-11-16 RX ORDER — METHOCARBAMOL 500 MG/1
500 TABLET, FILM COATED ORAL
Qty: 30 TAB | Refills: 1 | Status: SHIPPED | OUTPATIENT
Start: 2020-11-16 | End: 2021-09-03 | Stop reason: SDUPTHER

## 2020-11-16 RX ORDER — GLUCOSAMINE SULFATE 1500 MG
1000 POWDER IN PACKET (EA) ORAL DAILY
Qty: 90 CAP | Refills: 3 | Status: SHIPPED | OUTPATIENT
Start: 2020-11-16 | End: 2021-09-03 | Stop reason: SDUPTHER

## 2020-11-16 NOTE — TELEPHONE ENCOUNTER
----- Message from Kesha Elizabeth sent at 11/16/2020 11:33 AM EST -----  Regarding: Dr Kimmy Velazquez (if not patient): Lacie Lea Coordinator for Medical Packaging      Relationship of caller (if not patient): Clinical Coordinator for Medical Packaging      Best contact number(s): [phone]      Name of medication and dosage if known: Robaxin 500mg, Vitamin D3 1000mg, Omega Fish Oil 1000mg, Apirin 81 mg      Is patient out of this medication (yes/no): yes      Pharmacy name: [facility] listed in chart? (yes/no): no  Pharmacy phone [phone]      Details to clarify the request: Pharmacy needs a confirmation call for all her medications, there is some discrepancies between what was ordered by the physician and what was actually sent to the pharmacy. This pharmacy wants to set up a medication pack for this patient.        Message from [facility]

## 2020-12-03 ENCOUNTER — TELEPHONE (OUTPATIENT)
Dept: INTERNAL MEDICINE CLINIC | Age: 85
End: 2020-12-03

## 2020-12-03 NOTE — TELEPHONE ENCOUNTER
----- Message from Tung Griggs sent at 11/30/2020 11:25 AM EST -----  Regarding: FW: Dr. Kavita Fernandes    ----- Message -----  From: Kena Coats  Sent: 11/30/2020  11:23 AM EST  To: Tung Griggs  Subject: FW: Dr. Kavita Fernandes                             ----- Message -----  From: Montross Wes  Sent: 11/30/2020  10:35 AM EST  To: Sai  Office Pool  Subject: Dr. Jake Doran first and last name:  Reason for call: Possibly regarding referral   Callback required yes/no and why: Yes, wants to know if it was pertaining to referral.  Best contact number(s): [phone]  Details to clarify the request: Pt received a call from office  last Friday 11/27/2020. No message was left. Pt is not sure if it was pertaining to the referral and she has an appt with Dr. Alice Frederick on 12/07/2020. Please give a call back.     Copy/paste Envera

## 2020-12-04 NOTE — TELEPHONE ENCOUNTER
Spoke with pt anne advised that Vaughn Barba approved referral for Dr. Jay Garcia pt that a second referral has been submitted to Dr. Jessa Bradford and is pending.

## 2020-12-07 ENCOUNTER — OFFICE VISIT (OUTPATIENT)
Dept: NEUROLOGY | Age: 85
End: 2020-12-07
Payer: MEDICARE

## 2020-12-07 ENCOUNTER — HOSPITAL ENCOUNTER (OUTPATIENT)
Dept: LAB | Age: 85
Discharge: HOME | End: 2020-12-07
Payer: MEDICARE

## 2020-12-07 VITALS
OXYGEN SATURATION: 98 % | DIASTOLIC BLOOD PRESSURE: 71 MMHG | RESPIRATION RATE: 12 BRPM | SYSTOLIC BLOOD PRESSURE: 162 MMHG | HEART RATE: 61 BPM | WEIGHT: 128 LBS | TEMPERATURE: 97.5 F | BODY MASS INDEX: 21.85 KG/M2 | HEIGHT: 64 IN

## 2020-12-07 DIAGNOSIS — I65.23 BILATERAL CAROTID ARTERY STENOSIS: ICD-10-CM

## 2020-12-07 DIAGNOSIS — R41.82 ACUTE ALTERATION IN MENTAL STATUS: ICD-10-CM

## 2020-12-07 DIAGNOSIS — I67.89 CEREBRAL MICROVASCULOPATHY: ICD-10-CM

## 2020-12-07 DIAGNOSIS — E55.9 VITAMIN D DEFICIENCY: ICD-10-CM

## 2020-12-07 DIAGNOSIS — E53.8 B12 DEFICIENCY: Primary | ICD-10-CM

## 2020-12-07 DIAGNOSIS — E03.4 HYPOTHYROIDISM DUE TO ACQUIRED ATROPHY OF THYROID: ICD-10-CM

## 2020-12-07 DIAGNOSIS — R41.3 DISTURBANCE OF MEMORY: ICD-10-CM

## 2020-12-07 PROCEDURE — 84443 ASSAY THYROID STIM HORMONE: CPT

## 2020-12-07 PROCEDURE — 82306 VITAMIN D 25 HYDROXY: CPT

## 2020-12-07 PROCEDURE — 1101F PT FALLS ASSESS-DOCD LE1/YR: CPT | Performed by: PSYCHIATRY & NEUROLOGY

## 2020-12-07 PROCEDURE — G8399 PT W/DXA RESULTS DOCUMENT: HCPCS | Performed by: PSYCHIATRY & NEUROLOGY

## 2020-12-07 PROCEDURE — G8754 DIAS BP LESS 90: HCPCS | Performed by: PSYCHIATRY & NEUROLOGY

## 2020-12-07 PROCEDURE — G8420 CALC BMI NORM PARAMETERS: HCPCS | Performed by: PSYCHIATRY & NEUROLOGY

## 2020-12-07 PROCEDURE — G8536 NO DOC ELDER MAL SCRN: HCPCS | Performed by: PSYCHIATRY & NEUROLOGY

## 2020-12-07 PROCEDURE — G8510 SCR DEP NEG, NO PLAN REQD: HCPCS | Performed by: PSYCHIATRY & NEUROLOGY

## 2020-12-07 PROCEDURE — 99205 OFFICE O/P NEW HI 60 MIN: CPT | Performed by: PSYCHIATRY & NEUROLOGY

## 2020-12-07 PROCEDURE — 82607 VITAMIN B-12: CPT

## 2020-12-07 PROCEDURE — G8427 DOCREV CUR MEDS BY ELIG CLIN: HCPCS | Performed by: PSYCHIATRY & NEUROLOGY

## 2020-12-07 PROCEDURE — 36415 COLL VENOUS BLD VENIPUNCTURE: CPT

## 2020-12-07 PROCEDURE — 1090F PRES/ABSN URINE INCON ASSESS: CPT | Performed by: PSYCHIATRY & NEUROLOGY

## 2020-12-07 PROCEDURE — G8753 SYS BP > OR = 140: HCPCS | Performed by: PSYCHIATRY & NEUROLOGY

## 2020-12-07 NOTE — PROGRESS NOTES
Consult  REFERRED BY:  Peggy Giang MD    CHIEF COMPLAINT: Memory loss      Subjective:     Harpreet Weeks is a 80 y.o. right-handed -American female seen as a new patient to me, at the request of Dr. Thuy Muñoz for evaluation of new problem of progressive memory loss for the last 2 years that has come on gradually, seems to be impairing her ability to function, but the patient having difficulty now doing her ADL activities, managing her financial affairs, and just having more difficulty functioning. It seems to be more recent memory as her past memories are fair. She is had no recent fever, trauma, infection, toxin exposure, stroke, TIA, or major new metabolic problems to cause her problem, and denies any unusual stress depression, denies any shuffling gait or bowel or bladder incontinence to suggest normal pressure hydrocephalus, and has had no testing done for her memory loss. She does have a family history that her father had dementia but no other dementia in the family. She has a high school education and worked as an aide in the medical field. She is also has a known mild carotid stenosis on the Doppler done in 2017 but no recent repeats were done. She takes aspirin and Lipitor and hydralazine and hydrochlorothiazide and lisinopril and metoprolol and Metformin and fish oil as her medications. She is having difficulty functioning now because of her memory loss and is facing a serious neurologic problem.     Past Medical History:   Diagnosis Date    Arrhythmia     Arthritis     Bilateral cataracts     Chest pain     Diabetes ([address]     Essential hypertension     Hypercholesteremia     Hypertension       Past Surgical History:   Procedure Laterality Date    HX GYN      D & C    HX ORTHOPAEDIC      Carpal tunnel release right wrist     Family History   Problem Relation Age of Onset    Diabetes Mother     Allergic Rhinitis Mother     Heart Disease Father     Hypertension Father     Dementia Father     Heart Disease Brother     Cancer Sister         breast    Cancer Brother         lung    COPD Brother     Cancer Brother         lung    COPD Brother     Diabetes Brother     Diabetes Brother     Other Brother         spinal meningitis    Diabetes Sister         leg amputation    Other Son         Inclusion body myositis    Prostate Cancer Son     Prostate Cancer Son     Hypertension Son     Hypertension Son       Social History     Tobacco Use    Smoking status: Never Smoker    Smokeless tobacco: Never Used   Substance Use Topics    Alcohol use: No         Current Outpatient Medications:     aspirin delayed-release 81 mg tablet, Take 1 Tab by mouth daily. , Disp: 90 Tab, Rfl: 3    omega 3-DHA-EPA-fish oil 1,000 mg (120 mg-180 mg) capsule, Take 1 Cap by mouth daily. , Disp: 90 Cap, Rfl: 3    methocarbamoL (Robaxin) 500 mg tablet, Take 1 Tab by mouth every eight to twelve (8-12) hours as needed for Muscle Spasm(s). , Disp: 30 Tab, Rfl: 1    cholecalciferol (Vitamin D3) 25 mcg (1,000 unit) cap, Take 1 Cap by mouth daily. , Disp: 90 Cap, Rfl: 3    amLODIPine (NORVASC) 10 mg tablet, Take 1 Tab by mouth daily. , Disp: 90 Tab, Rfl: 3    atorvastatin (LIPITOR) 20 mg tablet, Take 1 Tab by mouth daily. , Disp: 90 Tab, Rfl: 3    glucose blood VI test strips (FreeStyle Lite Strips) strip, by Does Not Apply route daily. , Disp: 300 Strip, Rfl: 3    lancets (FreeStyle Lancets) 28 gauge misc, USE 1  TO CHECK GLUCOSE ONCE DAILY, Disp: 300 Lancet, Rfl: 3    lisinopril-hydroCHLOROthiazide (PRINZIDE, ZESTORETIC) 20-12.5 mg per tablet, Take 1 Tab by mouth two (2) times a day., Disp: 180 Tab, Rfl: 3    metFORMIN (GLUCOPHAGE) 500 mg tablet, One qam, 2 qpm, Disp: 270 Tab, Rfl: 3    metoprolol succinate (TOPROL-XL) 100 mg tablet, Take 1 Tab by mouth daily. , Disp: 90 Tab, Rfl: 3    ibuprofen (MOTRIN) 400 mg tablet, Take 1 Tab by mouth every eight (8) hours as needed for Pain., Disp: 90 Tab, Rfl: 3   hydrALAZINE (APRESOLINE) 25 mg tablet, Take 1 Tab by mouth daily. , Disp: 90 Tab, Rfl: 3    temazepam (RESTORIL) 15 mg capsule, TAKE 1 CAPSULE BY MOUTH AT BEDTIME AS NEEDED FOR  SLEEP, Disp: 30 Cap, Rfl: 5        No Known Allergies   MRI Results (most recent):  No results found for this or any previous visit. No results found for this or any previous visit. Review of Systems:  A comprehensive review of systems was negative except for: Constitutional: positive for fatigue and malaise  Eyes: positive for visual disturbance  Ears, nose, mouth, throat, and face: positive for hearing loss  Musculoskeletal: positive for myalgias, arthralgias and stiff joints  Neurological: positive for memory problems  Behvioral/Psych: positive for anxiety and depression   Vitals:    12/07/20 0923   BP: (!) 162/71   Pulse: 61   Resp: 12   Temp: 97.5 °F (36.4 °C)   SpO2: 98%   Weight: 128 lb (58.1 kg)   Height: 5' 4\" (1.626 m)     Objective:     I      NEUROLOGICAL EXAM:    Appearance: The patient is well developed, well nourished, provides a fair history and is in no acute distress. Mental Status: Oriented to time, but missed the day of the month, place and person, and the president, patient could not do serial sevens, cannot spell world backward, could not remember 1 of 3 words at 30 seconds, and could not draw a clock that shows a time 10 minutes to rule out, cognitive function is abnormal and speech is fluent and no aphasia or dysarthria. Mood and affect appropriate but anxious and a little depressed. Cranial Nerves:   Intact visual fields. Fundi are benign, disc are flat, no lesions seen on funduscopy. TERRANCE, EOM's full, no nystagmus, no ptosis. Facial sensation is normal. Corneal reflexes are not tested. Facial movement is symmetric. Hearing is abnormal bilaterally. Palate is midline with normal sternocleidomastoid and trapezius muscles are normal. Tongue is midline.   Neck without meningismus or bruits  Temporal arteries are not tender or enlarged  TMJ areas are not tender on palpation   Motor:  4/5 strength in upper and lower proximal and distal muscles. Normal bulk and tone. No fasciculations. Rapid alternating movement is symmetric and slow bilaterally   Reflexes:   Deep tendon reflexes 2+/4 and symmetrical.  No babinski or clonus present   Sensory:   Normal to touch, pinprick and vibration and temperature slightly decreased in both feet. DSS is intact   Gait:  Normal gait for patient's age, though patient does move slowly due to her arthritis. Tremor:   No tremor noted. Cerebellar:  No abnormal cerebellar signs present on Romberg and tandem testing and finger-nose-finger exam.   Neurovascular:  Normal heart sounds and regular rhythm, peripheral pulses decreased, and no carotid bruits. Assessment:       ICD-10-CM ICD-9-CM    1. B12 deficiency  E53.8 266.2 VITAMIN D, 25 HYDROXY      VITAMIN B12 & FOLATE      TSH 3RD GENERATION      DUPLEX CAROTID BILATERAL      MRI BRAIN W WO CONT      REFERRAL TO NEUROPSYCHOLOGY   2. Vitamin D deficiency  E55.9 268.9 VITAMIN D, 25 HYDROXY      VITAMIN B12 & FOLATE      TSH 3RD GENERATION      DUPLEX CAROTID BILATERAL      MRI BRAIN W WO CONT      REFERRAL TO NEUROPSYCHOLOGY   3. Hypothyroidism due to acquired atrophy of thyroid  E03.4 244.8 VITAMIN D, 25 HYDROXY     246.8 VITAMIN B12 & FOLATE      TSH 3RD GENERATION      DUPLEX CAROTID BILATERAL      MRI BRAIN W WO CONT      REFERRAL TO NEUROPSYCHOLOGY   4. Disturbance of memory  R41.3 780.93 VITAMIN D, 25 HYDROXY      VITAMIN B12 & FOLATE      TSH 3RD GENERATION      DUPLEX CAROTID BILATERAL      MRI BRAIN W WO CONT      REFERRAL TO NEUROPSYCHOLOGY   5.  Acute alteration in mental status  R41.82 780.97 VITAMIN D, 25 HYDROXY      VITAMIN B12 & FOLATE      TSH 3RD GENERATION      DUPLEX CAROTID BILATERAL      MRI BRAIN W WO CONT      REFERRAL TO NEUROPSYCHOLOGY   6. Cerebral microvasculopathy  I67.89 437.8 VITAMIN D, 25 HYDROXY VITAMIN B12 & FOLATE      TSH 3RD GENERATION      DUPLEX CAROTID BILATERAL      MRI BRAIN W WO CONT      REFERRAL TO NEUROPSYCHOLOGY   7. Bilateral carotid artery stenosis  I65.23 433.10 VITAMIN D, 25 HYDROXY     433.30 VITAMIN B12 & FOLATE      TSH 3RD GENERATION      DUPLEX CAROTID BILATERAL      MRI BRAIN W WO CONT      REFERRAL TO NEUROPSYCHOLOGY     Active Problems:    * No active hospital problems. *      Plan:     Patient with memory loss that seems most consistent with amnestic form of dementia like Alzheimer's disease, and she needs an extensive evaluation to rule out any other treatable cause because she has a serious problem if she has this progressive degenerative brain condition. Patient will get neuropsych testing, MRI scan, B12 level, vitamin D level, and thyroid test to rule out treatable causes of her symptoms. Because of her history of carotid stenosis she will get a Doppler study to rule out vascular dementia. She is encouraged to remain mentally and physically active, try to exercise regularly, read books, do problem solving puzzles and mental games, stay socially active, and eating good diet, control her blood pressure and her blood sugar, and her cholesterol and do not smoke to control her vascular risk factors as those are the 4 main vascular risk factors. She was taking multivitamin every day and continue her vitamin D. Her condition discussed with the patient and her niece in detail, and they agree with plans and therapy as above. Patient certainly would seem to be a candidate for cognitive enhancing agents if her neuropsych testing supports our presumed diagnosis  1 hour from the patient and her family over her history, reviewing her chart records, discussing her diagnosis prognosis, treatment needed, testing needed, and therapeutic options at this time.   Follow-up in 1 to 3 months time for results of her clinical course and testing results she will call immediately if there is any problem or questions otherwise.     Signed By: Geetha Dias MD     December 7, 2020       CC: Duncan Brenner MD  FAX: [phone]

## 2020-12-07 NOTE — LETTER
12/7/20 Patient: Florinda Choudhary YOB: 1935 Date of Visit: 12/7/2020 Chanelle Enrique MD 
[address] [facility] [address] [address] VIA In Basket Dear Chanelle Enrique MD, Thank you for referring Ms. Debbie Hernandez to [address] for evaluation. My notes for this consultation are attached. Consult REFERRED BY: 
Zan Elizondo MD 
 
CHIEF COMPLAINT: Memory loss Subjective:  
 
Florinda Choudhary is a 80 y.o. right-handed -American female seen as a new patient to me, at the request of Dr. Rivas Quinones for evaluation of new problem of progressive memory loss for the last 2 years that has come on gradually, seems to be impairing her ability to function, but the patient having difficulty now doing her ADL activities, managing her financial affairs, and just having more difficulty functioning. It seems to be more recent memory as her past memories are fair. She is had no recent fever, trauma, infection, toxin exposure, stroke, TIA, or major new metabolic problems to cause her problem, and denies any unusual stress depression, denies any shuffling gait or bowel or bladder incontinence to suggest normal pressure hydrocephalus, and has had no testing done for her memory loss. She does have a family history that her father had dementia but no other dementia in the family. She has a high school education and worked as an aide in the medical field. She is also has a known mild carotid stenosis on the Doppler done in 2017 but no recent repeats were done. She takes aspirin and Lipitor and hydralazine and hydrochlorothiazide and lisinopril and metoprolol and Metformin and fish oil as her medications. She is having difficulty functioning now because of her memory loss and is facing a serious neurologic problem. Past Medical History:  
Diagnosis Date  Arrhythmia  Arthritis  Bilateral cataracts  Chest pain  Diabetes ([address]  Essential hypertension  Hypercholesteremia  Hypertension Past Surgical History:  
Procedure Laterality Date  HX GYN    
 D & C  
 HX ORTHOPAEDIC Carpal tunnel release right wrist  
 
Family History Problem Relation Age of Onset  Diabetes Mother  Allergic Rhinitis Mother  Heart Disease Father  Hypertension Father  Dementia Father  Heart Disease Brother  Cancer Sister   
     breast  
 Cancer Brother   
     lung  COPD Brother  Cancer Brother   
     lung  COPD Brother  Diabetes Brother  Diabetes Brother  Other Brother   
     spinal meningitis  Diabetes Sister   
     leg amputation  Other Son Inclusion body myositis  Prostate Cancer Son  Prostate Cancer Son   
 Hypertension Son   
[facility] Hypertension Son   
  
Social History Tobacco Use  Smoking status: Never Smoker  Smokeless tobacco: Never Used Substance Use Topics  Alcohol use: No  
   
 
Current Outpatient Medications:  
  aspirin delayed-release 81 mg tablet, Take 1 Tab by mouth daily. , Disp: 90 Tab, Rfl: 3 
  omega 3-DHA-EPA-fish oil 1,000 mg (120 mg-180 mg) capsule, Take 1 Cap by mouth daily. , Disp: 90 Cap, Rfl: 3 
  methocarbamoL (Robaxin) 500 mg tablet, Take 1 Tab by mouth every eight to twelve (8-12) hours as needed for Muscle Spasm(s). , Disp: 30 Tab, Rfl: 1   cholecalciferol (Vitamin D3) 25 mcg (1,000 unit) cap, Take 1 Cap by mouth daily. , Disp: 90 Cap, Rfl: 3 
  amLODIPine (NORVASC) 10 mg tablet, Take 1 Tab by mouth daily. , Disp: 90 Tab, Rfl: 3 
  atorvastatin (LIPITOR) 20 mg tablet, Take 1 Tab by mouth daily. , Disp: 90 Tab, Rfl: 3 
  glucose blood VI test strips (FreeStyle Lite Strips) strip, by Does Not Apply route daily. , Disp: 300 Strip, Rfl: 3 
  lancets (FreeStyle Lancets) 28 gauge misc, USE 1  TO CHECK GLUCOSE ONCE DAILY, Disp: 300 Lancet, Rfl: 3   lisinopril-hydroCHLOROthiazide (PRINZIDE, ZESTORETIC) 20-12.5 mg per tablet, Take 1 Tab by mouth two (2) times a day., Disp: 180 Tab, Rfl: 3 
  metFORMIN (GLUCOPHAGE) 500 mg tablet, One qam, 2 qpm, Disp: 270 Tab, Rfl: 3 
  metoprolol succinate (TOPROL-XL) 100 mg tablet, Take 1 Tab by mouth daily. , Disp: 90 Tab, Rfl: 3 
  ibuprofen (MOTRIN) 400 mg tablet, Take 1 Tab by mouth every eight (8) hours as needed for Pain., Disp: 90 Tab, Rfl: 3 
  hydrALAZINE (APRESOLINE) 25 mg tablet, Take 1 Tab by mouth daily. , Disp: 90 Tab, Rfl: 3 
  temazepam (RESTORIL) 15 mg capsule, TAKE 1 CAPSULE BY MOUTH AT BEDTIME AS NEEDED FOR  SLEEP, Disp: 30 Cap, Rfl: 5 No Known Allergies MRI Results (most recent): No results found for this or any previous visit. No results found for this or any previous visit. Review of Systems: A comprehensive review of systems was negative except for: Constitutional: positive for fatigue and malaise Eyes: positive for visual disturbance Ears, nose, mouth, throat, and face: positive for hearing loss Musculoskeletal: positive for myalgias, arthralgias and stiff joints Neurological: positive for memory problems Behvioral/Psych: positive for anxiety and depression Vitals:  
 12/07/20 4582 BP: (!) 162/71 Pulse: 61 Resp: 12 Temp: 97.5 °F (36.4 °C) SpO2: 98% Weight: 128 lb (58.1 kg) Height: 5' 4\" (1.626 m) Objective: I 
 
 
NEUROLOGICAL EXAM: 
 
Appearance: The patient is well developed, well nourished, provides a fair history and is in no acute distress. Mental Status: Oriented to time, but missed the day of the month, place and person, and the president, patient could not do serial sevens, cannot spell world backward, could not remember 1 of 3 words at 30 seconds, and could not draw a clock that shows a time 10 minutes to rule out, cognitive function is abnormal and speech is fluent and no aphasia or dysarthria. Mood and affect appropriate but anxious and a little depressed. Cranial Nerves:   Intact visual fields. Fundi are benign, disc are flat, no lesions seen on funduscopy. TERRANCE, EOM's full, no nystagmus, no ptosis. Facial sensation is normal. Corneal reflexes are not tested. Facial movement is symmetric. Hearing is abnormal bilaterally. Palate is midline with normal sternocleidomastoid and trapezius muscles are normal. Tongue is midline. Neck without meningismus or bruits Temporal arteries are not tender or enlarged TMJ areas are not tender on palpation Motor:  4/5 strength in upper and lower proximal and distal muscles. Normal bulk and tone. No fasciculations. Rapid alternating movement is symmetric and slow bilaterally Reflexes:   Deep tendon reflexes 2+/4 and symmetrical. 
No babinski or clonus present Sensory:   Normal to touch, pinprick and vibration and temperature slightly decreased in both feet. DSS is intact Gait:  Normal gait for patient's age, though patient does move slowly due to her arthritis. Tremor:   No tremor noted. Cerebellar:  No abnormal cerebellar signs present on Romberg and tandem testing and finger-nose-finger exam.  
Neurovascular:  Normal heart sounds and regular rhythm, peripheral pulses decreased, and no carotid bruits. Assessment: ICD-10-CM ICD-9-CM 1. B12 deficiency  E53.8 266.2 VITAMIN D, 25 HYDROXY  
   VITAMIN B12 & FOLATE  
   TSH 3RD GENERATION  
   DUPLEX CAROTID BILATERAL  
   MRI BRAIN W WO CONT  
   REFERRAL TO NEUROPSYCHOLOGY 2. Vitamin D deficiency  E55.9 268.9 VITAMIN D, 25 HYDROXY  
   VITAMIN B12 & FOLATE  
   TSH 3RD GENERATION  
   DUPLEX CAROTID BILATERAL  
   MRI BRAIN W WO CONT  
   REFERRAL TO NEUROPSYCHOLOGY 3. Hypothyroidism due to acquired atrophy of thyroid  E03.4 244.8 VITAMIN D, 25 HYDROXY  
  246.8 VITAMIN B12 & FOLATE  
   TSH 3RD GENERATION  
   DUPLEX CAROTID BILATERAL  
   MRI BRAIN W WO CONT  
   REFERRAL TO NEUROPSYCHOLOGY 4. Disturbance of memory  R41.3 780.93 VITAMIN D, 25 HYDROXY  
   VITAMIN B12 & FOLATE  
   TSH 3RD GENERATION  
   DUPLEX CAROTID BILATERAL  
   MRI BRAIN W WO CONT  
   REFERRAL TO NEUROPSYCHOLOGY 5. Acute alteration in mental status  R41.82 780.97 VITAMIN D, 25 HYDROXY  
   VITAMIN B12 & FOLATE  
   TSH 3RD GENERATION  
   DUPLEX CAROTID BILATERAL  
   MRI BRAIN W WO CONT  
   REFERRAL TO NEUROPSYCHOLOGY 6. Cerebral microvasculopathy  I67.89 437.8 VITAMIN D, 25 HYDROXY  
   VITAMIN B12 & FOLATE  
   TSH 3RD GENERATION  
   DUPLEX CAROTID BILATERAL  
   MRI BRAIN W WO CONT  
   REFERRAL TO NEUROPSYCHOLOGY 7. Bilateral carotid artery stenosis  I65.23 433.10 VITAMIN D, 25 HYDROXY  
  433.30 VITAMIN B12 & FOLATE  
   TSH 3RD GENERATION  
   DUPLEX CAROTID BILATERAL  
   MRI BRAIN W WO CONT  
   REFERRAL TO NEUROPSYCHOLOGY Active Problems: * No active hospital problems. * 
 
 
Plan:  
 
Patient with memory loss that seems most consistent with amnestic form of dementia like Alzheimer's disease, and she needs an extensive evaluation to rule out any other treatable cause because she has a serious problem if she has this progressive degenerative brain condition. Patient will get neuropsych testing, MRI scan, B12 level, vitamin D level, and thyroid test to rule out treatable causes of her symptoms. Because of her history of carotid stenosis she will get a Doppler study to rule out vascular dementia. She is encouraged to remain mentally and physically active, try to exercise regularly, read books, do problem solving puzzles and mental games, stay socially active, and eating good diet, control her blood pressure and her blood sugar, and her cholesterol and do not smoke to control her vascular risk factors as those are the 4 main vascular risk factors. She was taking multivitamin every day and continue her vitamin D. Her condition discussed with the patient and her niece in detail, and they agree with plans and therapy as above. Patient certainly would seem to be a candidate for cognitive enhancing agents if her neuropsych testing supports our presumed diagnosis 1 hour from the patient and her family over her history, reviewing her chart records, discussing her diagnosis prognosis, treatment needed, testing needed, and therapeutic options at this time. Follow-up in 1 to 3 months time for results of her clinical course and testing results she will call immediately if there is any problem or questions otherwise. Signed By: Jake Holland MD   
 December 7, 2020 CC: Mary Olivares MD 
FAX: [phone] If you have questions, please do not hesitate to call me. I look forward to following your patient along with you. Sincerely, Jake Holland MD

## 2020-12-08 LAB
25(OH)D3+25(OH)D2 SERPL-MCNC: 53.1 NG/ML (ref 30–100)
FOLATE SERPL-MCNC: 6.8 NG/ML
TSH SERPL DL<=0.005 MIU/L-ACNC: 2.01 UIU/ML (ref 0.45–4.5)
VIT B12 SERPL-MCNC: 329 PG/ML (ref 232–1245)

## 2020-12-22 ENCOUNTER — HOSPITAL ENCOUNTER (OUTPATIENT)
Dept: MRI IMAGING | Age: 85
Discharge: HOME | End: 2020-12-22
Attending: PSYCHIATRY & NEUROLOGY
Payer: MEDICARE

## 2020-12-22 DIAGNOSIS — I67.89 CEREBRAL MICROVASCULOPATHY: ICD-10-CM

## 2020-12-22 DIAGNOSIS — E53.8 B12 DEFICIENCY: ICD-10-CM

## 2020-12-22 DIAGNOSIS — R41.3 DISTURBANCE OF MEMORY: ICD-10-CM

## 2020-12-22 DIAGNOSIS — E03.4 HYPOTHYROIDISM DUE TO ACQUIRED ATROPHY OF THYROID: ICD-10-CM

## 2020-12-22 DIAGNOSIS — I65.23 BILATERAL CAROTID ARTERY STENOSIS: ICD-10-CM

## 2020-12-22 DIAGNOSIS — R41.82 ACUTE ALTERATION IN MENTAL STATUS: ICD-10-CM

## 2020-12-22 DIAGNOSIS — E55.9 VITAMIN D DEFICIENCY: ICD-10-CM

## 2020-12-22 PROCEDURE — 70553 MRI BRAIN STEM W/O & W/DYE: CPT

## 2020-12-22 PROCEDURE — 74011250636 HC RX REV CODE- 250/636: Performed by: PSYCHIATRY & NEUROLOGY

## 2020-12-22 PROCEDURE — A9575 INJ GADOTERATE MEGLUMI 0.1ML: HCPCS | Performed by: PSYCHIATRY & NEUROLOGY

## 2020-12-22 RX ORDER — GADOTERATE MEGLUMINE 376.9 MG/ML
15 INJECTION INTRAVENOUS
Status: COMPLETED | OUTPATIENT
Start: 2020-12-22 | End: 2020-12-22

## 2020-12-22 RX ADMIN — GADOTERATE MEGLUMINE 15 ML: 376.9 INJECTION INTRAVENOUS at 10:17

## 2020-12-30 ENCOUNTER — TELEPHONE (OUTPATIENT)
Dept: NEUROLOGY | Age: 85
End: 2020-12-30

## 2020-12-30 NOTE — TELEPHONE ENCOUNTER
----- Message from Venancio sent at 12/30/2020 12:10 PM EST -----  Regarding: Dr. Capo Dang  Appointment not available    Caller's first and last name and relationship to patient (if not the patient):  Hector Dominguez (Son)      Best contact number:  [phone]      Preferred date and time:  Next available      Scheduled appointment date and time:  N/A      Reason for appointment:  New patient evaluation and testing      Details to clarify the request:  Mr. Scott Rodgres is requesting a call back to schedule a new patient evaluation and neuropsychological testing. She was referred by Dr. Sobia Craft

## 2021-01-20 ENCOUNTER — VIRTUAL VISIT (OUTPATIENT)
Dept: NEUROLOGY | Age: 86
End: 2021-01-20
Payer: MEDICARE

## 2021-01-20 DIAGNOSIS — F03.90 MAJOR NEUROCOGNITIVE DISORDER DUE TO ALZHEIMER'S DISEASE, PROBABLE, WITHOUT BEHAVIORAL DISTURBANCE: Primary | ICD-10-CM

## 2021-01-20 PROCEDURE — 96116 NUBHVL XM PHYS/QHP 1ST HR: CPT | Performed by: PSYCHOLOGIST

## 2021-01-20 NOTE — PROGRESS NOTES
This note will not be viewable in [employer] for the following reason(s). Likely risk of substantial harm from the misinterpretation of data generated by this evaluation. Pursuant to the emergency declaration under the [facility], [facility] waiver authority and the [employer] Act, this Virtual Visit was conducted, with appropriate consent obtained, to reduce the patient's risk of exposure to COVID-19 and provide continuity of care   Services were provided in this manner to substitute for in-person clinic visit. The originating site is the patient's home and the distance site is [facility]. These types of teleneuropsychology/telehealth/telemedicine visits were authorized by the President of the [place], though I/we cannot guarantee what a third party payor will do reimbursement/coverage wise. I indicated that I would evaluate the patient and recommend diagnostics and treatment based on my assessment and impressions, and that our sessions are not being recorded and that personal health information is protected to the best of our abilities. [facility] at [address]    Office:  [phone]  Fax: [phone]                 Initial Office Exam    Patient Name: Myra Juarez  Age: 80 y.o. Gender: female   Occupation: CNA  Handedness: right handed   Presenting Concern:Memmory loss  Primary Care Physician: Sunita Ford MD  Referring Provider: Brad Knott MD      REASON FOR REFERRAL:  This comprehensive and medically necessary neuropsychological assessment was requested to assist with a differential diagnosis of dementia.   The use and purpose of this examination, as well as the extent and limitations of confidentiality, were explained prior to obtaining permission to participate. Instructions were provided regarding the necessity to put forth optimal effort and answer questions truthfully in order to obtain reliable and accurate test results. PERTINENT HISTORY:  Ms. Cayla Ibarra presented for a neuropsychological assessment at the recommendation of her treating physician secondary to complaints of impaired short-term memory, decreased attention, decreased communication,  managing finances. Ms. Cayla Ibarra began noticing symptoms in the past two years. She is noted to have carotid stenosis based on a 2017 doppler study. Her father reportedly had dementia. From a brief review of her medical and personal history there has not been any other significant neurological injury or illness noted or reported. She did report experiencing depression in the past.      Ms. Cayla Ibarra does not  report any problems at birth or difficulties meeting developmental milestones. She reports that she had an adequate level of family support and was not subject to trauma or abuse as a child. Ms. Cayla Ibarra does not  report being retain in school or receiving special assistance in any of she classes or subjects. Ms. Cayla Ibarra completed 12 years of education. Ms. Cayla Ibarra does not  exercise on a regular basis and does  maintain a balanced diet. She does  report problems with sleep and medications and does not  complain of pain. She does  participate in mentally stimulating activities. Ms. Cayla Ibarra does not  have concerns regarding prescription medications, family members, place of residence, or financial stressors. Ms. Cayla Ibarra indicated that she is independent in her instrumental activities of daily living, including shopping, meal preparation, housekeeping, doing laundry, driving a car, managing medications, and finances. Current Outpatient Medications   Medication Sig    aspirin delayed-release 81 mg tablet Take 1 Tab by mouth daily.     omega 3-DHA-EPA-fish oil 1,000 mg (120 mg-180 mg) capsule Take 1 Cap by mouth daily.  methocarbamoL (Robaxin) 500 mg tablet Take 1 Tab by mouth every eight to twelve (8-12) hours as needed for Muscle Spasm(s).  cholecalciferol (Vitamin D3) 25 mcg (1,000 unit) cap Take 1 Cap by mouth daily.  amLODIPine (NORVASC) 10 mg tablet Take 1 Tab by mouth daily.  atorvastatin (LIPITOR) 20 mg tablet Take 1 Tab by mouth daily.  glucose blood VI test strips (FreeStyle Lite Strips) strip by Does Not Apply route daily.  lancets (FreeStyle Lancets) 28 gauge misc USE 1  TO CHECK GLUCOSE ONCE DAILY    lisinopril-hydroCHLOROthiazide (PRINZIDE, ZESTORETIC) 20-12.5 mg per tablet Take 1 Tab by mouth two (2) times a day.  metFORMIN (GLUCOPHAGE) 500 mg tablet One qam, 2 qpm    metoprolol succinate (TOPROL-XL) 100 mg tablet Take 1 Tab by mouth daily.  ibuprofen (MOTRIN) 400 mg tablet Take 1 Tab by mouth every eight (8) hours as needed for Pain.  hydrALAZINE (APRESOLINE) 25 mg tablet Take 1 Tab by mouth daily.  temazepam (RESTORIL) 15 mg capsule TAKE 1 CAPSULE BY MOUTH AT BEDTIME AS NEEDED FOR  SLEEP     No current facility-administered medications for this visit. Past Medical History:   Diagnosis Date    Arrhythmia     Arthritis     Bilateral cataracts     Chest pain     Diabetes ([address]     Essential hypertension     Hypercholesteremia     Hypertension        No flowsheet data found.     No data recorded    Past Surgical History:   Procedure Laterality Date    HX GYN      D & C    HX ORTHOPAEDIC      Carpal tunnel release right wrist       Social History     Socioeconomic History    Marital status:      Spouse name: Not on file    Number of children: Not on file    Years of education: Not on file    Highest education level: Not on file   Tobacco Use    Smoking status: Never Smoker    Smokeless tobacco: Never Used   Substance and Sexual Activity    Alcohol use: No    Drug use: Yes     Types: Prescription, OTC    Sexual activity: Never       Family History   Problem Relation Age of Onset    Diabetes Mother     Allergic Rhinitis Mother     Heart Disease Father     Hypertension Father     Dementia Father     Heart Disease Brother     Cancer Sister         breast    Cancer Brother         lung    COPD Brother     Cancer Brother         lung    COPD Brother     Diabetes Brother     Diabetes Brother     Other Brother         spinal meningitis    Diabetes Sister         leg amputation    Other Son         Inclusion body myositis    Prostate Cancer Son     Prostate Cancer Son     Hypertension Son     Hypertension Son        CT Results (most recent):  No results found for this or any previous visit. MRI Results (most recent):  Results from [place] encounter on 12/22/20   MRI BRAIN W WO CONT    Narrative EXAM:  MRI BRAIN W WO CONT    INDICATION:    memory loss    COMPARISON:  None. CONTRAST: 15 ml Dot[employer]. TECHNIQUE:    Multiplanar multisequence acquisition without and with contrast of the brain. FINDINGS:  The ventricles are normal in size and position. There is a 4 mm T2 hypointense  lesion in the left frontal lobe with blooming on GRE images. There are otherwise  only minimal scattered nonspecific T2/FLAIR white matter hyperintensities. There  is no acute infarct, hemorrhage, extra-axial fluid collection, or mass effect. There is no cerebellar tonsillar herniation. Expected arterial flow-voids are  present. No evidence of abnormal enhancement. The paranasal sinuses, mastoid air cells, and middle ears are clear. The orbital  contents are within normal limits with bilateral lens implants. No significant  osseous or scalp lesions are identified. Impression IMPRESSION:     1. No acute or significant intracranial abnormality. Minimal scattered  nonspecific T2/FLAIR white matter hyperintensities.   2. Incidental 4 mm lesion in the left frontal lobe with associated  susceptibility hypointensity, which may represent a small cavernous malformation  versus small chronic microhemorrhage. MENTAL STATUS:    Orientation:  Oriented to year, season, and month   Eye Contact:  Appropriate   Motor Behavior:   Not assessed   Speech:   Fluent and intelligible   Thought Process:  Goal oriented   Thought Content:  No evidence of hallucinations or delusions   Suicidal ideations:  Denies   Mood:   Euthymic   Affect:   Congruent with stated mood   Concentration:   Impaired   Abstraction:   Impaired   Insight:   Mildly decreased     On the Modified Mini-Mental Status Exam: 60/100 (< 1%ile)      DIAGNOSTIC IMPRESSIONS:    ICD-10-CM ICD-9-CM    1. Major neurocognitive disorder due to Alzheimer's disease, probable, without behavioral disturbance ([facility]  F01.50 331.0      294.10              PLAN:  1. Complete a comprehensive neuropsychological assessment to provide a differential diagnosis of presenting concerns as well as to assist with disposition and treatment planning as appropriate. 2. Consider compensatory and remedial cognitive training. 3. Consider pharmacological interventions for cognitive enhancement  4. She should stop driving or consider an adaptive driving evaluation. 5. Consider referral for elder health nurse to provide an in-home functional assessment. 6. Consider placement issues to provide greater structure and supervision to ensure safety, health and well-being. 64523 x 1 Review of records. Face to face interview w/ patient. Determine test protocol: 60 minutes. Total 1 unit        Min Villegas, PhD, ABPP, LCP  Licensed Clinical Psychologist/ Neuropsychologist        This note will not be viewable in [address]

## 2021-01-28 ENCOUNTER — TELEPHONE (OUTPATIENT)
Dept: NEUROLOGY | Age: 86
End: 2021-01-28

## 2021-01-28 NOTE — TELEPHONE ENCOUNTER
[facility]     Scanned to chart

## 2021-02-01 NOTE — TELEPHONE ENCOUNTER
rcvd fax with auth but see servicing provider is listed as Dr. Jenna Pop instead of Dr. Mc Fletcher. Called matt and spoke/w ameya and she is sending to be updated.  Pt appt scheduled for 02/12/21

## 2021-02-17 ENCOUNTER — OFFICE VISIT (OUTPATIENT)
Dept: NEUROLOGY | Age: 86
End: 2021-02-17
Payer: MEDICARE

## 2021-02-17 DIAGNOSIS — F03.90 MAJOR NEUROCOGNITIVE DISORDER DUE TO ALZHEIMER'S DISEASE, PROBABLE, WITHOUT BEHAVIORAL DISTURBANCE: Primary | ICD-10-CM

## 2021-02-17 PROCEDURE — 96138 PSYCL/NRPSYC TECH 1ST: CPT | Performed by: PSYCHOLOGIST

## 2021-02-17 PROCEDURE — 96132 NRPSYC TST EVAL PHYS/QHP 1ST: CPT | Performed by: PSYCHOLOGIST

## 2021-02-17 PROCEDURE — 96136 PSYCL/NRPSYC TST PHY/QHP 1ST: CPT | Performed by: PSYCHOLOGIST

## 2021-02-17 PROCEDURE — 96133 NRPSYC TST EVAL PHYS/QHP EA: CPT | Performed by: PSYCHOLOGIST

## 2021-02-17 PROCEDURE — 96139 PSYCL/NRPSYC TST TECH EA: CPT | Performed by: PSYCHOLOGIST

## 2021-02-17 PROCEDURE — 96137 PSYCL/NRPSYC TST PHY/QHP EA: CPT | Performed by: PSYCHOLOGIST

## 2021-02-17 NOTE — PROGRESS NOTES
This note will not be viewable in [employer] for the following reason(s). Likely risk of substantial harm from the misinterpretation of data generated by this evaluation. [facility] at [address]    Office:  [phone]  Fax: [phone]                                             Neuropsychological Evaluation Report      Patient Name: Jennifer Monroy  Age: 80 y.o. Gender: female   Occupation: CNA  Handedness: right handed   Presenting Concern:Memory loss  Primary Care Physician: Tyrel Barajas MD  Referring Provider: Shahana Mccain MD    PATIENT HISTORY (OBTAINED DURING INITIAL CLINICAL EVALUATION):    REASON FOR REFERRAL:  This comprehensive and medically necessary neuropsychological assessment was requested to assist with a differential diagnosis of dementia. The use and purpose of this examination, as well as the extent and limitations of confidentiality, were explained prior to obtaining permission to participate. Instructions were provided regarding the necessity to put forth optimal effort and answer questions truthfully in order to obtain reliable and accurate test results.     PERTINENT HISTORY:  Ms. Aly Burrell presented for a neuropsychological assessment at the recommendation of her treating physician secondary to complaints of impaired short-term memory, decreased attention, decreased communication,  managing finances. Ms. Aly Burrell began noticing symptoms in the past two years. She is noted to have carotid stenosis based on a 2017 doppler study. Her father reportedly had dementia. From a brief review of her medical and personal history there has not been any other significant neurological injury or illness noted or reported. She did report experiencing depression in the past.       Ms. Aly Burrell does not  report any problems at birth or difficulties meeting developmental milestones.  She reports that she had an adequate level of family support and was not subject to trauma or abuse as a child. Ms. Jung Miller does not  report being retain in school or receiving special assistance in any of she classes or subjects. Ms. Jung Miller completed 12 years of education.     Ms. Jung Miller does not  exercise on a regular basis and does  maintain a balanced diet. She does  report problems with sleep and medications and does not  complain of pain. She does  participate in mentally stimulating activities. Ms. Jung Miller does not  have concerns regarding prescription medications, family members, place of residence, or financial stressors. Ms. Jung Miller indicated that she is independent in her instrumental activities of daily living, including shopping, meal preparation, housekeeping, doing laundry, driving a car, managing medications, and finances.     METHODS OF ASSESSMENT (Current Evaluation):  Clinician Administered:  Clinical Interview  Review of Medical Records  Clock Drawing Task  Modified Mini-Mental Status Exam (3MS)  Test of Premorbid Functioning  Revised Memory and Behavior Checklist    Technician Administered:  [place] Dementia Rating Scale-2  NAB Judgment  Reliable Digit Span  Test of Memory Malingering  Test of Practical Judgment (9-Item)  Test of Premorbid Functioning  [place] Functional Living Scale  Trail Making Test    TEST OBSERVATIONS:  Ms. Jung Miller arrived promptly for the testing session. Dress and grooming were appropriate; physical presentation was unchanged from that observed during the clinical interview. Speech was fluent, intelligible, and goal-directed. Affect was congruent with the euthymic mood conveyed. Ms. Jung Miller was adequately cooperative and appeared to put forth good effort throughout this examination. Rapport with the examiner was adequately established and maintained. Minimal prompting was required. Comprehension of test instructions was not problematic.   Performance motivation was objectively measured by two instruments (TOMM, Reliable Digit Span), and Ms. Thaddeus Lovett produced a normal score on one of these measures. Accordingly, test findings below do not appear to be the product of disingenuous effort. Given the above observations, plus comments contained in the Mental Status section, the results of this examination are regarded as reasonably reliable and valid. TEST RESULTS:  Quantitative test results are derived from comparisons to age and education corrected cohort normative data, where applicable. Percentiles are included in these instances. Qualitative test results are determined using clinical observations. General Orientation and Awareness:       Orientation to person, year, month, day of month, day of week, state, town, and circumstance.    Awareness of deficits WNL                  Cognitive performance validity testing  Valid        Attention/Concentration:      Classification:      Simple visuomotor tracking (3 percentile)               Moderately Impaired  Digits forward (4 percentile)                 Moderately Impaired  Digits backward (5 percentile)                 Mildly Impaired    Cognitive Tests of Executive Functioning:     Ability to think flexibly, Trail Making B (6 percentile)              Mildly Impaired  Simple judgment in daily decision making (4 percentile)                         Moderately Impaired  Complex judgment in daily decision making (<1 percentile)              Severely Impaired    [place] Dementia Rating Scale - 2:        Scale                           SS                   Percentile  Attention                             6                     10                                         Low Average  Initiation/Perseveration      2                      1                                          Severely Impaired  Construction                      8                     28                                         Average  Conceptualization              2 <1                                          Severely Impaired  Memory                              5                     5                                           Mildly Impaired  Total                                   2                    <1                                          Severely Impaired     Adaptive Behavioral Measure of Daily Functioning:   Time:    <2 %ile   Money/calculations:   <2 %ile  Communication:    <9 %ile   Memory:     <2%ile    Total:     T= 26 (0.8%ile)    Intellectual and Basic Cognitive Functioning (WAIS-IV):  ACS Test of pre-morbid functioning reading recognition lower limit estimated WAIS-IV FSIQ score:       Estimated full scale IQ:           86 17 percentile       Low Average      IMPRESSIONS:  Ms. Urmila Mills was seen for a neuropsychological evaluation and administered a brief battery of measures assessing her basic cognitive abilities. Her performance on the [place] dementia rating scale yielded an overall score in the severely impaired range. Within this battery her constructional and attention abilities were the only areas to remained relatively intact. On measures assessing a more comprehensive attention ability she was found to be in the moderately impaired range. On measures of executive functioning, that included cognitive flexibility as well as simple and complex judgment in daily decision-making she was severely impaired. Her performance on a measure of adaptive daily functioning her performance again was in the severely impaired range. Her performance overall indicates a significant loss in cognitive functioning and places her in a range best described as demented. More comprehensive assessment does not appear to be warranted at this time and is not likely to reveal or result in different results. DIAGNOSTIC IMPRESSIONS:    ICD-10-CM ICD-9-CM    1.  Major neurocognitive disorder due to Alzheimer's disease, probable, without behavioral disturbance ([facility]  F01.50 331.0 294.10          RECOMMENDATIONS:   1. Findings should be reviewed with Ms. Imelda Saba to insure her understanding and discuss the potential implications. 2. Emphasis should be placed on Ms. Imelda Saba obtaining good sleep hygiene and maintaining adequate physical exercise to promote good brain health. 3. Ms. Imelda Saba will benefit from increased supervision and a structured environmental setting the would provide for her needs and security. 4. Ms. Imelda Saba should have a POA assigned to manage her decision making regarding her health care, financial, and legal affairs. Thank you for allowing me the opportunity to assist you in Ms. Melton's care. Please do not hesitate to contact me should you have additional questions that I may not have addressed. 29971 x 1  96137 x 1  96138 x 1  96139 x 4  96132 x 1  96133 x 1          Molly Layne, Ph.D., ABPP  Licensed Clinical Psychologist  Neuropsychologist  Board Certified Rehabilitation Psychologist      Time Documentation:     71929 x 1: Neurobehavioral Status Exam/Clinical Interview: (1 hour, (already billed on first date of service)     55838*9 Neuropsych testing/data gathering by Neuropsychologist (35 additional minutes, see above)      96138 x 1  96139 x 6 Test Administration/Data Gathering By Technician: (3.5 hours). 31233 x 1 (first 30 minutes), 86537 x 7 (each additional 30 minutes)     96132 x 1  96133 x 1 Testing Evaluation Services by Neuropsychologist (1 hour, 50 minutes) 96132 x 1 (first hour), 96133 x 1 (50 minutes)     Definitions:       28721/50210:  Neurobehavioral Status Exam, Clinical interview. Clinical assessment of thinking, reasoning and judgment, by neuropsychologist, both face to face time with patient and time interpreting those test results and reporting, first and subsequent hours)     09274/85220: Neuropsychological Test Administration by Technician/Psychometrist, first 30 minutes and each additional 30 minutes.       The above includes: Record review. Review of history provided by patient. Review of collaborative information. Testing by Clinician. Review of raw data. Scoring. Report writing of individual tests administered by Clinician. Integration of individual tests administered by psychometrist with NSE/testing by clinician, review of records/history/collaborative information, case Conceptualization, treatment planning, clinical decision making, report writing, coordination Of Care. Psychometry test codes as time spent by psychometrist administering and scoring neurocognitive/psychological tests under supervision of neuropsychologist.  Integral services including scoring of raw data, data interpretation, case conceptualization, report writing etcetera were initiated after the patient finished testing/raw data collected and was completed on the date the report was signed. This note will not be viewable in [address]

## 2021-02-17 NOTE — TELEPHONE ENCOUNTER
Called matt for update, spoke with ameya again and agent advised no update rcvd, she sent another email to have auth updated correctly.

## 2021-03-08 ENCOUNTER — OFFICE VISIT (OUTPATIENT)
Dept: NEUROLOGY | Age: 86
End: 2021-03-08
Payer: MEDICARE

## 2021-03-08 ENCOUNTER — OFFICE VISIT (OUTPATIENT)
Dept: NEUROLOGY | Age: 86
End: 2021-03-08

## 2021-03-08 VITALS
OXYGEN SATURATION: 98 % | TEMPERATURE: 97.7 F | DIASTOLIC BLOOD PRESSURE: 58 MMHG | HEIGHT: 64 IN | HEART RATE: 66 BPM | BODY MASS INDEX: 21 KG/M2 | SYSTOLIC BLOOD PRESSURE: 134 MMHG | WEIGHT: 123 LBS

## 2021-03-08 DIAGNOSIS — F03.90 MAJOR NEUROCOGNITIVE DISORDER DUE TO ALZHEIMER'S DISEASE, PROBABLE, WITHOUT BEHAVIORAL DISTURBANCE: Primary | ICD-10-CM

## 2021-03-08 DIAGNOSIS — R41.3 DISTURBANCE OF MEMORY: Primary | ICD-10-CM

## 2021-03-08 DIAGNOSIS — I65.23 BILATERAL CAROTID ARTERY STENOSIS: ICD-10-CM

## 2021-03-08 DIAGNOSIS — I67.89 CEREBRAL MICROVASCULOPATHY: ICD-10-CM

## 2021-03-08 PROCEDURE — 99214 OFFICE O/P EST MOD 30 MIN: CPT | Performed by: PSYCHIATRY & NEUROLOGY

## 2021-03-08 PROCEDURE — G8752 SYS BP LESS 140: HCPCS | Performed by: PSYCHIATRY & NEUROLOGY

## 2021-03-08 PROCEDURE — G8427 DOCREV CUR MEDS BY ELIG CLIN: HCPCS | Performed by: PSYCHIATRY & NEUROLOGY

## 2021-03-08 PROCEDURE — G8536 NO DOC ELDER MAL SCRN: HCPCS | Performed by: PSYCHIATRY & NEUROLOGY

## 2021-03-08 PROCEDURE — G8420 CALC BMI NORM PARAMETERS: HCPCS | Performed by: PSYCHIATRY & NEUROLOGY

## 2021-03-08 PROCEDURE — 90832 PSYTX W PT 30 MINUTES: CPT | Performed by: PSYCHOLOGIST

## 2021-03-08 PROCEDURE — G8510 SCR DEP NEG, NO PLAN REQD: HCPCS | Performed by: PSYCHIATRY & NEUROLOGY

## 2021-03-08 PROCEDURE — G8754 DIAS BP LESS 90: HCPCS | Performed by: PSYCHIATRY & NEUROLOGY

## 2021-03-08 PROCEDURE — G8399 PT W/DXA RESULTS DOCUMENT: HCPCS | Performed by: PSYCHIATRY & NEUROLOGY

## 2021-03-08 PROCEDURE — 1090F PRES/ABSN URINE INCON ASSESS: CPT | Performed by: PSYCHIATRY & NEUROLOGY

## 2021-03-08 PROCEDURE — 1101F PT FALLS ASSESS-DOCD LE1/YR: CPT | Performed by: PSYCHIATRY & NEUROLOGY

## 2021-03-08 RX ORDER — DONEPEZIL HYDROCHLORIDE 10 MG/1
TABLET, FILM COATED ORAL
Qty: 30 TAB | Refills: 11 | Status: SHIPPED | OUTPATIENT
Start: 2021-03-08 | End: 2021-09-03 | Stop reason: SDUPTHER

## 2021-03-08 NOTE — PROGRESS NOTES
[facility] at [place] [address], [address]    Office:  [phone]  Fax: [phone]                 Follow-up Session    Patient Jennifer Samuel  Age: 85 y.o. Gender: female   Occupation: CNA  Handedness: right handed   Presenting Concern:Memory loss  Primary Care Physician: Hermelindo Nick MD  Referring Darlene Merida MD       Krishna Morton is a 80 y.o. female who presents today for feedback following recent neuropsychological testing. The results were reviewed of the recent neuropsychological evaluation, including discussing individual tests as well as the patient's areas of neurocognitive strength versus their weaknesses. Education was provided regarding my diagnostic impressions, and we discussed next steps for further evaluation down the road. I all also answered numerous questions related to the clinical findings, including discussing various methods to improve cognitive cognition and mood when relevant. The patient is encouraged to follow-up with the referring provider. DIAGNOSTIC IMPRESSIONS:    ICD-10-CM ICD-9-CM    1.  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

## 2021-03-08 NOTE — LETTER
3/8/2021 Patient: Frank Mccrary YOB: 1935 Date of Visit: 3/8/2021 Glory Loaiza MD 
. [address] [address] [address] [employer] Dear Glory Loaiza MD, Thank you for referring Ms. Mojgan Sorto to [facility] for evaluation. My notes for this consultation are attached. Consult REFERRED BY: 
Sherry Cai MD 
 
CHIEF COMPLAINT: Memory loss Subjective:  
 
Frank Mccrary is a 80 y.o. right-handed -American female seen at the request of Dr. Elan Meredith for evaluation of new problem of needing to go over her test results of her neuropsych testing, which did show that she did have a moderate dementia state typical of Alzheimer's disease, and probably qualifies for cognitive enhancing agents. Her imaging was also gone over with her and her daughter in length, and reviewed personally on the PACS system, and it did show an MRI scan that just showed mild atrophic changes and white matter disease, and her Doppler study showed just mild to minimal stenosis, and her B12 level and vitamin D level were normal.  She has had no new change in her memory, and will be seeing Dr. Moses Vergara for official interpretation of his results with them in the family today. She will be started on Aricept 5 mg a day for 1 month and then advance to 10 mg a day thereafter if tolerated. She is advised of the side effect of medication as far as nausea GI side effects or any side effect as far as dizziness lightheadedness slow heart rate etc. to call us immediately or discontinue the medication. She is staying active physically and mentally taking her vitamins and vitamin D on a regular basis. We discussed the medications for memory loss and will start with Aricept first and advance to Namenda next.   Patient was seen 3 months ago for, progressive memory loss for the last 2 years that has come on gradually, seems to be impairing her ability to function, but the patient having difficulty now doing her ADL activities, managing her financial affairs, and just having more difficulty functioning. It seems to be more recent memory as her past memories are fair. She is had no recent fever, trauma, infection, toxin exposure, stroke, TIA, or major new metabolic problems to cause her problem, and denies any unusual stress depression, denies any shuffling gait or bowel or bladder incontinence to suggest normal pressure hydrocephalus, and has had no testing done for her memory loss. She does have a family history that her father had dementia but no other dementia in the family. She has a high school education and worked as an aide in the medical field. She is also has a known mild carotid stenosis on the Doppler done in 2017 but no recent repeats were done. She takes aspirin and Lipitor and hydralazine and hydrochlorothiazide and lisinopril and metoprolol and Metformin and fish oil as her medications. She is having difficulty functioning now because of her memory loss and is facing a serious neurologic problem. Past Medical History:  
Diagnosis Date  Arrhythmia  Arthritis  Bilateral cataracts  Chest pain  Diabetes ([address]  Essential hypertension  Hypercholesteremia  Hypertension Past Surgical History:  
Procedure Laterality Date  HX GYN    
 D & C  
 HX ORTHOPAEDIC Carpal tunnel release right wrist  
 
Family History Problem Relation Age of Onset  Diabetes Mother  Allergic Rhinitis Mother  Heart Disease Father  Hypertension Father  Dementia Father  Heart Disease Brother  Cancer Sister   
     breast  
 Cancer Brother   
     lung  COPD Brother  Cancer Brother   
     lung  COPD Brother  Diabetes Brother  Diabetes Brother  Other Brother   
     spinal meningitis  Diabetes Sister   
     leg amputation  Other Son Inclusion body myositis  Prostate Cancer Son  Prostate Cancer Son   
 Hypertension Son   
Maria L Mondragon Hypertension Son   
  
Social History Tobacco Use  Smoking status: Never Smoker  Smokeless tobacco: Never Used Substance Use Topics  Alcohol use: No  
   
 
Current Outpatient Medications:  
  donepeziL (ARICEPT) 10 mg tablet, 1/2 tab PO QD at supper for 30 days, then 1 tab po every day thereafter, Disp: 30 Tab, Rfl: 11 
  hydrALAZINE (APRESOLINE) 25 mg tablet, TAKE 1 TABLET BY MOUTH THREE TIMES DAILY, Disp: 90 Tab, Rfl: 3 
  lisinopril-hydroCHLOROthiazide (PRINZIDE, ZESTORETIC) 20-12.5 mg per tablet, Take 1 tablet by mouth twice daily, Disp: 180 Tab, Rfl: 3 
  aspirin delayed-release 81 mg tablet, Take 1 Tab by mouth daily. , Disp: 90 Tab, Rfl: 3 
  omega 3-DHA-EPA-fish oil 1,000 mg (120 mg-180 mg) capsule, Take 1 Cap by mouth daily. , Disp: 90 Cap, Rfl: 3 
  methocarbamoL (Robaxin) 500 mg tablet, Take 1 Tab by mouth every eight to twelve (8-12) hours as needed for Muscle Spasm(s). , Disp: 30 Tab, Rfl: 1   cholecalciferol (Vitamin D3) 25 mcg (1,000 unit) cap, Take 1 Cap by mouth daily. , Disp: 90 Cap, Rfl: 3 
  amLODIPine (NORVASC) 10 mg tablet, Take 1 Tab by mouth daily. , Disp: 90 Tab, Rfl: 3 
  atorvastatin (LIPITOR) 20 mg tablet, Take 1 Tab by mouth daily. , Disp: 90 Tab, Rfl: 3 
  glucose blood VI test strips (FreeStyle Lite Strips) strip, by Does Not Apply route daily. , Disp: 300 Strip, Rfl: 3 
  lancets (FreeStyle Lancets) 28 gauge misc, USE 1  TO CHECK GLUCOSE ONCE DAILY, Disp: 300 Lancet, Rfl: 3 
  metFORMIN (GLUCOPHAGE) 500 mg tablet, One qam, 2 qpm, Disp: 270 Tab, Rfl: 3 
  metoprolol succinate (TOPROL-XL) 100 mg tablet, Take 1 Tab by mouth daily. , Disp: 90 Tab, Rfl: 3 
  ibuprofen (MOTRIN) 400 mg tablet, Take 1 Tab by mouth every eight (8) hours as needed for Pain., Disp: 90 Tab, Rfl: 3 
  temazepam (RESTORIL) 15 mg capsule, TAKE 1 CAPSULE BY MOUTH AT BEDTIME AS NEEDED FOR  SLEEP, Disp: 30 Cap, Rfl: 5 No Known Allergies MRI Results (most recent): 
Results from [facility] Encounter encounter on 12/22/20 MRI BRAIN W WO CONT Narrative EXAM:  MRI BRAIN W WO CONT INDICATION:    memory loss COMPARISON:  None. CONTRAST: 15 ml Dotarem. TECHNIQUE:   
Multiplanar multisequence acquisition without and with contrast of the brain. FINDINGS: 
The ventricles are normal in size and position. There is a 4 mm T2 hypointense 
lesion in the left frontal lobe with blooming on GRE images. There are otherwise 
only minimal scattered nonspecific T2/FLAIR white matter hyperintensities. There 
is no acute infarct, hemorrhage, extra-axial fluid collection, or mass effect. There is no cerebellar tonsillar herniation. Expected arterial flow-voids are 
present. No evidence of abnormal enhancement. The paranasal sinuses, mastoid air cells, and middle ears are clear. The orbital 
contents are within normal limits with bilateral lens implants. No significant 
osseous or scalp lesions are identified. Impression IMPRESSION:  
 
1. No acute or significant intracranial abnormality. Minimal scattered 
nonspecific T2/FLAIR white matter hyperintensities. 2. Incidental 4 mm lesion in the left frontal lobe with associated 
susceptibility hypointensity, which may represent a small cavernous malformation 
versus small chronic microhemorrhage. Results from [facility] Encounter encounter on 12/22/20 MRI BRAIN W WO CONT Narrative EXAM:  MRI BRAIN W WO CONT INDICATION:    memory loss COMPARISON:  None. CONTRAST: 15 ml Dotarem. TECHNIQUE:   
Multiplanar multisequence acquisition without and with contrast of the brain. FINDINGS: 
The ventricles are normal in size and position. There is a 4 mm T2 hypointense 
lesion in the left frontal lobe with blooming on GRE images. There are otherwise 
only minimal scattered nonspecific T2/FLAIR white matter hyperintensities.  There 
is no acute infarct, hemorrhage, extra-axial fluid collection, or mass effect. There is no cerebellar tonsillar herniation. Expected arterial flow-voids are 
present. No evidence of abnormal enhancement. The paranasal sinuses, mastoid air cells, and middle ears are clear. The orbital 
contents are within normal limits with bilateral lens implants. No significant 
osseous or scalp lesions are identified. Impression IMPRESSION:  
 
1. No acute or significant intracranial abnormality. Minimal scattered 
nonspecific T2/FLAIR white matter hyperintensities. 2. Incidental 4 mm lesion in the left frontal lobe with associated 
susceptibility hypointensity, which may represent a small cavernous malformation 
versus small chronic microhemorrhage. Review of Systems: A comprehensive review of systems was negative except for: Constitutional: positive for fatigue and malaise Eyes: positive for visual disturbance Ears, nose, mouth, throat, and face: positive for hearing loss Musculoskeletal: positive for myalgias, arthralgias and stiff joints Neurological: positive for memory problems Behvioral/Psych: positive for anxiety and depression There were no vitals filed for this visit. Objective: I 
 
 
NEUROLOGICAL EXAM: 
 
Appearance: The patient is well developed, well nourished, provides a fair history and is in no acute distress. Mental Status: Oriented to time, but missed the day of the month, place and person, and the president, patient could not do serial sevens, cannot spell world backward, could not remember 1 of 3 words at 30 seconds, and could not draw a clock that shows a time 10 minutes to rule out, cognitive function is abnormal and speech is fluent and no aphasia or dysarthria. Mood and affect appropriate but anxious and a little depressed. Cranial Nerves:   Intact visual fields. Fundi are benign, disc are flat, no lesions seen on funduscopy. TERRANCE, EOM's full, no nystagmus, no ptosis.  Facial sensation is normal. Corneal reflexes are not tested. Facial movement is symmetric. Hearing is abnormal bilaterally. Palate is midline with normal sternocleidomastoid and trapezius muscles are normal. Tongue is midline. Neck without meningismus or bruits Temporal arteries are not tender or enlarged TMJ areas are not tender on palpation Motor:  4/5 strength in upper and lower proximal and distal muscles. Normal bulk and tone. No fasciculations. Rapid alternating movement is symmetric and slow bilaterally Reflexes:   Deep tendon reflexes 2+/4 and symmetrical. 
No babinski or clonus present Sensory:   Normal to touch, pinprick and vibration and temperature slightly decreased in both feet. DSS is intact Gait:  Normal gait for patient's age, though patient does move slowly due to her arthritis. Tremor:   No tremor noted. Cerebellar:  No abnormal cerebellar signs present on Romberg and tandem testing and finger-nose-finger exam.  
Neurovascular:  Normal heart sounds and regular rhythm, peripheral pulses decreased, and no carotid bruits. Assessment: ICD-10-CM ICD-9-CM 1. Disturbance of memory  R41.3 780.93 donepeziL (ARICEPT) 10 mg tablet 2. Bilateral carotid artery stenosis  I65.23 433.10 donepeziL (ARICEPT) 10 mg tablet 433.30 3. Cerebral microvasculopathy  I67.89 437.8 donepeziL (ARICEPT) 10 mg tablet Active Problems: * No active hospital problems. * 
 
 
Plan:  
 
Patient with memory loss that seems most consistent with amnestic form of dementia like Alzheimer's disease, and she needs cognitive enhancing agents, and agrees to try them, we will start her on Aricept first advanced her Namenda next, and the risk and benefits of Aricept explained again to the patient in detail and no side effects including GI side effects, slow heart rate, dizziness syncope diarrhea or any side effect to call us immediately.   She will take 5 mg each night at supper and then advance to 10 mg thereafter this was all explained to the patient and her daughter in detail. Reviewed the neuropsych testing in detail with the patient and reviewed the MRI scan with patient and her daughter in detail and all the lab tests with them. Patient was advised to stay mentally and physically active, exercise regular, read books, stay mentally active do crossword puzzles do computer work or any creative or hobby that stimulates the mind. She is advised to try to get 30 minutes of exercise a day mild exercise. Her MRI, and carotid Dopplers and lab tests were all okay for treatable causes of memory loss She is encouraged to remain mentally and physically active, try to exercise regularly, read books, do problem solving puzzles and mental games, stay socially active, and eating good diet, control her blood pressure and her blood sugar, and her cholesterol and do not smoke to control her vascular risk factors as those are the 4 main vascular risk factors. She was taking multivitamin every day and continue her vitamin D. Her condition discussed with the patient and her niece in detail, and they agree with plans and therapy as above. Patient certainly would seem to be a candidate for cognitive enhancing agents if her neuropsych testing supports our presumed diagnosis 1 hour from the patient and her family over her history, reviewing her chart records, discussing her diagnosis prognosis, treatment needed, testing needed, and therapeutic options at this time. Follow-up in 3 months time for consideration of starting Namenda then All this explained to the patient, all his test reviewed, time spent counseling the patient, 35 minutes spent with patient and daughter in detail today discussing all these problems and medicines and side effects of factors and going over all of her test in detail. Signed By: Ana Gomez MD   
 March 8, 2021 CC: Helen Harper MD 
FAX: [phone] If you have questions, please do not hesitate to call me. I look forward to following your patient along with you. Sincerely, Salvador Wick MD

## 2021-03-08 NOTE — PROGRESS NOTES
Consult  REFERRED BY:  Airam Pagan MD    CHIEF COMPLAINT: Memory loss      Subjective:     Sam Bowers is a 80 y.o. right-handed -American female seen at the request of Dr. Arlin Morley for evaluation of new problem of needing to go over her test results of her neuropsych testing, which did show that she did have a moderate dementia state typical of Alzheimer's disease, and probably qualifies for cognitive enhancing agents. Her imaging was also gone over with her and her daughter in length, and reviewed personally on the PACS system, and it did show an MRI scan that just showed mild atrophic changes and white matter disease, and her Doppler study showed just mild to minimal stenosis, and her B12 level and vitamin D level were normal.  She has had no new change in her memory, and will be seeing Dr. Trever Garcia for official interpretation of his results with them in the family today. She will be started on Aricept 5 mg a day for 1 month and then advance to 10 mg a day thereafter if tolerated. She is advised of the side effect of medication as far as nausea GI side effects or any side effect as far as dizziness lightheadedness slow heart rate etc. to call us immediately or discontinue the medication. She is staying active physically and mentally taking her vitamins and vitamin D on a regular basis. We discussed the medications for memory loss and will start with Aricept first and advance to Namenda next. Patient was seen 3 months ago for, progressive memory loss for the last 2 years that has come on gradually, seems to be impairing her ability to function, but the patient having difficulty now doing her ADL activities, managing her financial affairs, and just having more difficulty functioning. It seems to be more recent memory as her past memories are fair.   She is had no recent fever, trauma, infection, toxin exposure, stroke, TIA, or major new metabolic problems to cause her problem, and denies any unusual stress depression, denies any shuffling gait or bowel or bladder incontinence to suggest normal pressure hydrocephalus, and has had no testing done for her memory loss. She does have a family history that her father had dementia but no other dementia in the family. She has a high school education and worked as an aide in the medical field. She is also has a known mild carotid stenosis on the Doppler done in 2017 but no recent repeats were done. She takes aspirin and Lipitor and hydralazine and hydrochlorothiazide and lisinopril and metoprolol and Metformin and fish oil as her medications. She is having difficulty functioning now because of her memory loss and is facing a serious neurologic problem.     Past Medical History:   Diagnosis Date    Arrhythmia     Arthritis     Bilateral cataracts     Chest pain     Diabetes ([facility] [address]     Essential hypertension     Hypercholesteremia     Hypertension       Past Surgical History:   Procedure Laterality Date    HX GYN      D & C    HX ORTHOPAEDIC      Carpal tunnel release right wrist     Family History   Problem Relation Age of Onset    Diabetes Mother     Allergic Rhinitis Mother     Heart Disease Father     Hypertension Father     Dementia Father     Heart Disease Brother     Cancer Sister         breast    Cancer Brother         lung    COPD Brother     Cancer Brother         lung    COPD Brother     Diabetes Brother     Diabetes Brother     Other Brother         spinal meningitis    Diabetes Sister         leg amputation    Other Son         Inclusion body myositis    Prostate Cancer Son     Prostate Cancer Son     Hypertension Son     Hypertension Son       Social History     Tobacco Use    Smoking status: Never Smoker    Smokeless tobacco: Never Used   Substance Use Topics    Alcohol use: No         Current Outpatient Medications:     donepeziL (ARICEPT) 10 mg tablet, 1/2 tab PO QD at supper for 30 days, then 1 tab po every day thereafter, Disp: 30 Tab, Rfl: 11    hydrALAZINE (APRESOLINE) 25 mg tablet, TAKE 1 TABLET BY MOUTH THREE TIMES DAILY, Disp: 90 Tab, Rfl: 3    lisinopril-hydroCHLOROthiazide (PRINZIDE, ZESTORETIC) 20-12.5 mg per tablet, Take 1 tablet by mouth twice daily, Disp: 180 Tab, Rfl: 3    aspirin delayed-release 81 mg tablet, Take 1 Tab by mouth daily. , Disp: 90 Tab, Rfl: 3    omega 3-DHA-EPA-fish oil 1,000 mg (120 mg-180 mg) capsule, Take 1 Cap by mouth daily. , Disp: 90 Cap, Rfl: 3    methocarbamoL (Robaxin) 500 mg tablet, Take 1 Tab by mouth every eight to twelve (8-12) hours as needed for Muscle Spasm(s). , Disp: 30 Tab, Rfl: 1    cholecalciferol (Vitamin D3) 25 mcg (1,000 unit) cap, Take 1 Cap by mouth daily. , Disp: 90 Cap, Rfl: 3    amLODIPine (NORVASC) 10 mg tablet, Take 1 Tab by mouth daily. , Disp: 90 Tab, Rfl: 3    atorvastatin (LIPITOR) 20 mg tablet, Take 1 Tab by mouth daily. , Disp: 90 Tab, Rfl: 3    glucose blood VI test strips (FreeStyle Lite Strips) strip, by Does Not Apply route daily. , Disp: 300 Strip, Rfl: 3    lancets (FreeStyle Lancets) 28 gauge misc, USE 1  TO CHECK GLUCOSE ONCE DAILY, Disp: 300 Lancet, Rfl: 3    metFORMIN (GLUCOPHAGE) 500 mg tablet, One qam, 2 qpm, Disp: 270 Tab, Rfl: 3    metoprolol succinate (TOPROL-XL) 100 mg tablet, Take 1 Tab by mouth daily. , Disp: 90 Tab, Rfl: 3    ibuprofen (MOTRIN) 400 mg tablet, Take 1 Tab by mouth every eight (8) hours as needed for Pain., Disp: 90 Tab, Rfl: 3    temazepam (RESTORIL) 15 mg capsule, TAKE 1 CAPSULE BY MOUTH AT BEDTIME AS NEEDED FOR  SLEEP, Disp: 30 Cap, Rfl: 5        No Known Allergies   MRI Results (most recent):  Results from Hospital Encounter encounter on 12/22/20   MRI BRAIN W WO CONT    Narrative EXAM:  MRI BRAIN W WO CONT    INDICATION:    memory loss    COMPARISON:  None. CONTRAST: 15 ml Dotarem. TECHNIQUE:    Multiplanar multisequence acquisition without and with contrast of the brain.     FINDINGS:  The ventricles are normal in size and position. There is a 4 mm T2 hypointense  lesion in the left frontal lobe with blooming on GRE images. There are otherwise  only minimal scattered nonspecific T2/FLAIR white matter hyperintensities. There  is no acute infarct, hemorrhage, extra-axial fluid collection, or mass effect. There is no cerebellar tonsillar herniation. Expected arterial flow-voids are  present. No evidence of abnormal enhancement. The paranasal sinuses, mastoid air cells, and middle ears are clear. The orbital  contents are within normal limits with bilateral lens implants. No significant  osseous or scalp lesions are identified. Impression IMPRESSION:     1. No acute or significant intracranial abnormality. Minimal scattered  nonspecific T2/FLAIR white matter hyperintensities. 2. Incidental 4 mm lesion in the left frontal lobe with associated  susceptibility hypointensity, which may represent a small cavernous malformation  versus small chronic microhemorrhage. Results from [place] encounter on 12/22/20   MRI BRAIN W WO CONT    Narrative EXAM:  MRI BRAIN W WO CONT    INDICATION:    memory loss    COMPARISON:  None. CONTRAST: 15 ml Dotarem. TECHNIQUE:    Multiplanar multisequence acquisition without and with contrast of the brain. FINDINGS:  The ventricles are normal in size and position. There is a 4 mm T2 hypointense  lesion in the left frontal lobe with blooming on GRE images. There are otherwise  only minimal scattered nonspecific T2/FLAIR white matter hyperintensities. There  is no acute infarct, hemorrhage, extra-axial fluid collection, or mass effect. There is no cerebellar tonsillar herniation. Expected arterial flow-voids are  present. No evidence of abnormal enhancement. The paranasal sinuses, mastoid air cells, and middle ears are clear. The orbital  contents are within normal limits with bilateral lens implants.  No significant  osseous or scalp lesions are identified. Impression IMPRESSION:     1. No acute or significant intracranial abnormality. Minimal scattered  nonspecific T2/FLAIR white matter hyperintensities. 2. Incidental 4 mm lesion in the left frontal lobe with associated  susceptibility hypointensity, which may represent a small cavernous malformation  versus small chronic microhemorrhage. Review of Systems:  A comprehensive review of systems was negative except for: Constitutional: positive for fatigue and malaise  Eyes: positive for visual disturbance  Ears, nose, mouth, throat, and face: positive for hearing loss  Musculoskeletal: positive for myalgias, arthralgias and stiff joints  Neurological: positive for memory problems  Behvioral/Psych: positive for anxiety and depression   There were no vitals filed for this visit. Objective:     I      NEUROLOGICAL EXAM:    Appearance: The patient is well developed, well nourished, provides a fair history and is in no acute distress. Mental Status: Oriented to time, but missed the day of the month, place and person, and the president, patient could not do serial sevens, cannot spell world backward, could not remember 1 of 3 words at 30 seconds, and could not draw a clock that shows a time 10 minutes to rule out, cognitive function is abnormal and speech is fluent and no aphasia or dysarthria. Mood and affect appropriate but anxious and a little depressed. Cranial Nerves:   Intact visual fields. Fundi are benign, disc are flat, no lesions seen on funduscopy. TERRANCE, EOM's full, no nystagmus, no ptosis. Facial sensation is normal. Corneal reflexes are not tested. Facial movement is symmetric. Hearing is abnormal bilaterally. Palate is midline with normal sternocleidomastoid and trapezius muscles are normal. Tongue is midline.   Neck without meningismus or bruits  Temporal arteries are not tender or enlarged  TMJ areas are not tender on palpation   Motor:  4/5 strength in upper and lower proximal and distal muscles. Normal bulk and tone. No fasciculations. Rapid alternating movement is symmetric and slow bilaterally   Reflexes:   Deep tendon reflexes 2+/4 and symmetrical.  No babinski or clonus present   Sensory:   Normal to touch, pinprick and vibration and temperature slightly decreased in both feet. DSS is intact   Gait:  Normal gait for patient's age, though patient does move slowly due to her arthritis. Tremor:   No tremor noted. Cerebellar:  No abnormal cerebellar signs present on Romberg and tandem testing and finger-nose-finger exam.   Neurovascular:  Normal heart sounds and regular rhythm, peripheral pulses decreased, and no carotid bruits. Assessment:       ICD-10-CM ICD-9-CM    1. Disturbance of memory  R41.3 780.93 donepeziL (ARICEPT) 10 mg tablet   2. Bilateral carotid artery stenosis  I65.23 433.10 donepeziL (ARICEPT) 10 mg tablet     433.30    3. Cerebral microvasculopathy  I67.89 437.8 donepeziL (ARICEPT) 10 mg tablet     Active Problems:    * No active hospital problems. *      Plan:     Patient with memory loss that seems most consistent with amnestic form of dementia like Alzheimer's disease, and she needs cognitive enhancing agents, and agrees to try them, we will start her on Aricept first advanced her Namenda next, and the risk and benefits of Aricept explained again to the patient in detail and no side effects including GI side effects, slow heart rate, dizziness syncope diarrhea or any side effect to call us immediately. She will take 5 mg each night at supper and then advance to 10 mg thereafter this was all explained to the patient and her daughter in detail. Reviewed the neuropsych testing in detail with the patient and reviewed the MRI scan with patient and her daughter in detail and all the lab tests with them.   Patient was advised to stay mentally and physically active, exercise regular, read books, stay mentally active do crossword puzzles do computer work or any creative or hobby that stimulates the mind. She is advised to try to get 30 minutes of exercise a day mild exercise. Her MRI, and carotid Dopplers and lab tests were all okay for treatable causes of memory loss  She is encouraged to remain mentally and physically active, try to exercise regularly, read books, do problem solving puzzles and mental games, stay socially active, and eating good diet, control her blood pressure and her blood sugar, and her cholesterol and do not smoke to control her vascular risk factors as those are the 4 main vascular risk factors. She was taking multivitamin every day and continue her vitamin D. Her condition discussed with the patient and her niece in detail, and they agree with plans and therapy as above. Patient certainly would seem to be a candidate for cognitive enhancing agents if her neuropsych testing supports our presumed diagnosis  1 hour from the patient and her family over her history, reviewing her chart records, discussing her diagnosis prognosis, treatment needed, testing needed, and therapeutic options at this time. Follow-up in 3 months time for consideration of starting Namenda then  All this explained to the patient, all his test reviewed, time spent counseling the patient, 35 minutes spent with patient and daughter in detail today discussing all these problems and medicines and side effects of factors and going over all of her test in detail.   Signed By: Phyllis Feliz MD     March 8, 2021       CC: Michelle Albarado MD  FAX: [phone]

## 2021-03-17 ENCOUNTER — TELEPHONE (OUTPATIENT)
Dept: INTERNAL MEDICINE CLINIC | Age: 86
End: 2021-03-17

## 2021-03-17 NOTE — TELEPHONE ENCOUNTER
Angela Craft at [facility] states pt is coming back to them for the pill pack as it works better for pt. Angela Craft is requesting a current med list to be faxed to them today and she will see what is needed and call back for refills.     Fax #[phone]  ATTN: Angela Craft

## 2021-03-19 DIAGNOSIS — F51.01 PRIMARY INSOMNIA: ICD-10-CM

## 2021-03-19 RX ORDER — TEMAZEPAM 15 MG/1
CAPSULE ORAL
Qty: 30 CAP | Refills: 5 | Status: SHIPPED | OUTPATIENT
Start: 2021-03-19 | End: 2021-09-03 | Stop reason: SDUPTHER

## 2021-04-08 ENCOUNTER — TRANSCRIBE ORDER (OUTPATIENT)
Dept: INTERNAL MEDICINE CLINIC | Age: 86
End: 2021-04-08

## 2021-04-26 ENCOUNTER — OFFICE VISIT (OUTPATIENT)
Dept: INTERNAL MEDICINE CLINIC | Age: 86
End: 2021-04-26
Payer: MEDICARE

## 2021-04-26 VITALS
TEMPERATURE: 95.9 F | OXYGEN SATURATION: 100 % | BODY MASS INDEX: 19.81 KG/M2 | SYSTOLIC BLOOD PRESSURE: 137 MMHG | DIASTOLIC BLOOD PRESSURE: 65 MMHG | WEIGHT: 116 LBS | HEART RATE: 59 BPM | RESPIRATION RATE: 16 BRPM | HEIGHT: 64 IN

## 2021-04-26 DIAGNOSIS — G30.1 LATE ONSET ALZHEIMER'S DEMENTIA WITHOUT BEHAVIORAL DISTURBANCE (HCC): ICD-10-CM

## 2021-04-26 DIAGNOSIS — E11.9 CONTROLLED TYPE 2 DIABETES MELLITUS WITHOUT COMPLICATION, WITHOUT LONG-TERM CURRENT USE OF INSULIN (HCC): ICD-10-CM

## 2021-04-26 DIAGNOSIS — I10 ESSENTIAL HYPERTENSION: ICD-10-CM

## 2021-04-26 DIAGNOSIS — R63.4 WEIGHT LOSS: Primary | ICD-10-CM

## 2021-04-26 DIAGNOSIS — F02.80 LATE ONSET ALZHEIMER'S DEMENTIA WITHOUT BEHAVIORAL DISTURBANCE (HCC): ICD-10-CM

## 2021-04-26 PROCEDURE — G8754 DIAS BP LESS 90: HCPCS | Performed by: INTERNAL MEDICINE

## 2021-04-26 PROCEDURE — 1101F PT FALLS ASSESS-DOCD LE1/YR: CPT | Performed by: INTERNAL MEDICINE

## 2021-04-26 PROCEDURE — G8399 PT W/DXA RESULTS DOCUMENT: HCPCS | Performed by: INTERNAL MEDICINE

## 2021-04-26 PROCEDURE — 1090F PRES/ABSN URINE INCON ASSESS: CPT | Performed by: INTERNAL MEDICINE

## 2021-04-26 PROCEDURE — G8752 SYS BP LESS 140: HCPCS | Performed by: INTERNAL MEDICINE

## 2021-04-26 PROCEDURE — G8536 NO DOC ELDER MAL SCRN: HCPCS | Performed by: INTERNAL MEDICINE

## 2021-04-26 PROCEDURE — G8427 DOCREV CUR MEDS BY ELIG CLIN: HCPCS | Performed by: INTERNAL MEDICINE

## 2021-04-26 PROCEDURE — G8510 SCR DEP NEG, NO PLAN REQD: HCPCS | Performed by: INTERNAL MEDICINE

## 2021-04-26 PROCEDURE — 99214 OFFICE O/P EST MOD 30 MIN: CPT | Performed by: INTERNAL MEDICINE

## 2021-04-26 PROCEDURE — G8420 CALC BMI NORM PARAMETERS: HCPCS | Performed by: INTERNAL MEDICINE

## 2021-04-26 NOTE — PATIENT INSTRUCTIONS
Office Policies Phone calls/patient messages: Please allow up to 24 hours for someone in the office to contact you about your call or message. Be mindful your provider may be out of the office or your message may require further review. We encourage you to use [employer] for your messages as this is a faster, more efficient way to communicate with our office Medication Refills: 
         
Prescription medications require 48-72 business hours to process. We encourage you to use [employer] for your refills. For controlled medications: Please allow 72 business hours to process. Certain medications may require you to  a written prescription at our office. NO narcotic/controlled medications will be prescribed after 4pm Monday through Friday or on weekends Form/Paperwork Completion: 
         
Please note a $25 fee may incur for all paperwork for completed by our providers. We ask that you allow 7-10 business days. Pre-payment is due prior to picking up/faxing the completed form. You may also download your forms to [employer] to have your doctor print off.

## 2021-04-27 LAB
BUN SERPL-MCNC: 17 MG/DL (ref 8–27)
BUN/CREAT SERPL: 20 (ref 12–28)
CALCIUM SERPL-MCNC: 10.2 MG/DL (ref 8.7–10.3)
CHLORIDE SERPL-SCNC: 96 MMOL/L (ref 96–106)
CO2 SERPL-SCNC: 23 MMOL/L (ref 20–29)
CREAT SERPL-MCNC: 0.85 MG/DL (ref 0.57–1)
ERYTHROCYTE [DISTWIDTH] IN BLOOD BY AUTOMATED COUNT: 13.5 % (ref 11.7–15.4)
EST. AVERAGE GLUCOSE BLD GHB EST-MCNC: 143 MG/DL
GLUCOSE SERPL-MCNC: 109 MG/DL (ref 65–99)
HBA1C MFR BLD: 6.6 % (ref 4.8–5.6)
HCT VFR BLD AUTO: 31.2 % (ref 34–46.6)
HGB BLD-MCNC: 10.6 G/DL (ref 11.1–15.9)
MCH RBC QN AUTO: 29.9 PG (ref 26.6–33)
MCHC RBC AUTO-ENTMCNC: 34 G/DL (ref 31.5–35.7)
MCV RBC AUTO: 88 FL (ref 79–97)
PLATELET # BLD AUTO: 133 X10E3/UL (ref 150–450)
POTASSIUM SERPL-SCNC: 4.6 MMOL/L (ref 3.5–5.2)
RBC # BLD AUTO: 3.55 X10E6/UL (ref 3.77–5.28)
SODIUM SERPL-SCNC: 139 MMOL/L (ref 134–144)
TSH SERPL DL<=0.005 MIU/L-ACNC: 1.24 UIU/ML (ref 0.45–4.5)
WBC # BLD AUTO: 3.6 X10E3/UL (ref 3.4–10.8)

## 2021-05-17 ENCOUNTER — TELEPHONE (OUTPATIENT)
Dept: INTERNAL MEDICINE CLINIC | Age: 86
End: 2021-05-17

## 2021-05-17 NOTE — TELEPHONE ENCOUNTER
----- Message from Chelsea Dee sent at 5/17/2021  9:18 AM EDT -----  Regarding: /Telephone  General Message/Vendor Calls    Caller's first and last name: Amol Tinoco       Reason for call: She states that the pt had a 9am appt this morning but they dont have a referral for her there in order for to see Dr. Sonam Carrion required yes/no and why: yes       Best contact number(s): [phone]       Message from [place]

## 2021-05-24 NOTE — TELEPHONE ENCOUNTER
Spoke with Dr. Velma Castellon office and advised that no referral was requested before appt. Advised pt to call office with her next appt date so referral can be processed ahead of time.

## 2021-06-15 ENCOUNTER — OFFICE VISIT (OUTPATIENT)
Dept: NEUROLOGY | Age: 86
End: 2021-06-15
Payer: MEDICARE

## 2021-06-15 VITALS
BODY MASS INDEX: 20.38 KG/M2 | SYSTOLIC BLOOD PRESSURE: 130 MMHG | OXYGEN SATURATION: 99 % | WEIGHT: 115 LBS | HEIGHT: 63 IN | DIASTOLIC BLOOD PRESSURE: 80 MMHG | HEART RATE: 60 BPM | RESPIRATION RATE: 12 BRPM

## 2021-06-15 DIAGNOSIS — I65.23 BILATERAL CAROTID ARTERY STENOSIS: ICD-10-CM

## 2021-06-15 DIAGNOSIS — G30.1 ALZHEIMER'S TYPE DEMENTIA WITH LATE ONSET WITHOUT BEHAVIORAL DISTURBANCE (HCC): ICD-10-CM

## 2021-06-15 DIAGNOSIS — F02.80 ALZHEIMER'S TYPE DEMENTIA WITH LATE ONSET WITHOUT BEHAVIORAL DISTURBANCE (HCC): ICD-10-CM

## 2021-06-15 DIAGNOSIS — R41.3 DISTURBANCE OF MEMORY: Primary | ICD-10-CM

## 2021-06-15 PROCEDURE — G8399 PT W/DXA RESULTS DOCUMENT: HCPCS | Performed by: PSYCHIATRY & NEUROLOGY

## 2021-06-15 PROCEDURE — G8754 DIAS BP LESS 90: HCPCS | Performed by: PSYCHIATRY & NEUROLOGY

## 2021-06-15 PROCEDURE — G8427 DOCREV CUR MEDS BY ELIG CLIN: HCPCS | Performed by: PSYCHIATRY & NEUROLOGY

## 2021-06-15 PROCEDURE — 99214 OFFICE O/P EST MOD 30 MIN: CPT | Performed by: PSYCHIATRY & NEUROLOGY

## 2021-06-15 PROCEDURE — 1101F PT FALLS ASSESS-DOCD LE1/YR: CPT | Performed by: PSYCHIATRY & NEUROLOGY

## 2021-06-15 PROCEDURE — G8752 SYS BP LESS 140: HCPCS | Performed by: PSYCHIATRY & NEUROLOGY

## 2021-06-15 PROCEDURE — 1090F PRES/ABSN URINE INCON ASSESS: CPT | Performed by: PSYCHIATRY & NEUROLOGY

## 2021-06-15 PROCEDURE — G8432 DEP SCR NOT DOC, RNG: HCPCS | Performed by: PSYCHIATRY & NEUROLOGY

## 2021-06-15 PROCEDURE — G8536 NO DOC ELDER MAL SCRN: HCPCS | Performed by: PSYCHIATRY & NEUROLOGY

## 2021-06-15 PROCEDURE — G8420 CALC BMI NORM PARAMETERS: HCPCS | Performed by: PSYCHIATRY & NEUROLOGY

## 2021-06-15 RX ORDER — MEMANTINE HYDROCHLORIDE 5 MG/1
TABLET ORAL
Qty: 70 TABLET | Refills: 0 | Status: SHIPPED | OUTPATIENT
Start: 2021-06-15 | End: 2021-08-10 | Stop reason: ALTCHOICE

## 2021-06-15 RX ORDER — MEMANTINE HYDROCHLORIDE 10 MG/1
10 TABLET ORAL 2 TIMES DAILY
Qty: 60 TABLET | Refills: 11 | Status: SHIPPED | OUTPATIENT
Start: 2021-06-15 | End: 2021-08-10 | Stop reason: ALTCHOICE

## 2021-06-15 NOTE — PROGRESS NOTES
Consult  REFERRED BY:  Maude Choe MD    CHIEF COMPLAINT: Memory loss      Subjective:     Cheryl Ruiz is a 80 y.o. right-handed -American female seen at the request of Dr. Lexi Gonzalez for evaluation of new problem of being evaluated for progressive memory problems and to assess her response to being started on Aricept 10 mg a day which she has now worked up to and is tolerating well without GI side effects. The daughter thinks it may be helping some, but she seems to be still slowly slipping. She was started on the medication 3 months ago. We discussed other therapeutic options and the daughter would like to try the new medication of Namenda because she does have moderate dementia on neuropsych testing and is a candidate for that medication to. We gave her a starter dose for the first month and gave her the maintenance dose of 10 mg twice a day after that. The risk and benefits and side effects all explained to the daughter and the patient we explained that in combination with Aricept and the clinical studies the side effect profile was the same as placebo but to watch for increased agitation dizziness, constipation, diarrhea, or any side effect to call us. Her neuropsych testing did show that she did have a moderate dementia state typical of Alzheimer's disease, and an MRI scan that just showed mild atrophic changes and white matter disease, and her Doppler study showed just mild to minimal stenosis, and her B12 level and vitamin D level were normal.  She is staying active physically and mentally taking her vitamins and vitamin D on a regular basis. We discussed the medications for memory loss and will start with Aricept first and advance to [facility] now.   Patient was seen 3 months ago for, progressive memory loss for the last 2 years that has come on gradually, seems to be impairing her ability to function, but the patient having difficulty now doing her ADL activities, managing her financial affairs, and just having more difficulty functioning. It seems to be more recent memory as her past memories are fair. She is had no recent fever, trauma, infection, toxin exposure, stroke, TIA, or major new metabolic problems to cause her problem, and denies any unusual stress depression, denies any shuffling gait or bowel or bladder incontinence to suggest normal pressure hydrocephalus, and has had no testing done for her memory loss. She does have a family history that her father had dementia but no other dementia in the family. She has a high school education and worked as an aide in the medical field. She is also has a known mild carotid stenosis on the Doppler done in December 2020. She takes aspirin and Lipitor and hydralazine and hydrochlorothiazide and lisinopril and metoprolol and Metformin and fish oil as her medications. She is having difficulty functioning now because of her memory loss and is facing a serious neurologic problem.     Past Medical History:   Diagnosis Date    Arrhythmia     Arthritis     Bilateral cataracts     Chest pain     Diabetes ([address]     Essential hypertension     Hypercholesteremia     Hypertension       Past Surgical History:   Procedure Laterality Date    HX GYN      D & C    HX ORTHOPAEDIC      Carpal tunnel release right wrist     Family History   Problem Relation Age of Onset    Diabetes Mother     Allergic Rhinitis Mother     Heart Disease Father     Hypertension Father     Dementia Father     Heart Disease Brother     Cancer Sister         breast    Cancer Brother         lung    COPD Brother     Cancer Brother         lung    COPD Brother     Diabetes Brother     Diabetes Brother     Other Brother         spinal meningitis    Diabetes Sister         leg amputation    Other Son         Inclusion body myositis    Prostate Cancer Son     Prostate Cancer Son     Hypertension Son     Hypertension Son       Social History     Tobacco Use    Smoking status: Never Smoker    Smokeless tobacco: Never Used   Substance Use Topics    Alcohol use: No         Current Outpatient Medications:     memantine (NAMENDA) 5 mg tablet, Week 1: take 1 daily, Week 2: take 1 AM and 1 PM; Week 3: take 1 AM and 2 PM; Week 4: take 2 AM and 2 PM, Disp: 70 Tablet, Rfl: 0    memantine (Namenda) 10 mg tablet, Take 1 Tablet by mouth two (2) times a day., Disp: 60 Tablet, Rfl: 11    temazepam (RESTORIL) 15 mg capsule, One qhs, Disp: 30 Cap, Rfl: 5    donepeziL (ARICEPT) 10 mg tablet, 1/2 tab PO QD at supper for 30 days, then 1 tab po every day thereafter (Patient taking differently: 1 tab po every day thereafter), Disp: 30 Tab, Rfl: 11    hydrALAZINE (APRESOLINE) 25 mg tablet, TAKE 1 TABLET BY MOUTH THREE TIMES DAILY, Disp: 90 Tab, Rfl: 3    lisinopril-hydroCHLOROthiazide (PRINZIDE, ZESTORETIC) 20-12.5 mg per tablet, Take 1 tablet by mouth twice daily, Disp: 180 Tab, Rfl: 3    aspirin delayed-release 81 mg tablet, Take 1 Tab by mouth daily. , Disp: 90 Tab, Rfl: 3    omega 3-DHA-EPA-fish oil 1,000 mg (120 mg-180 mg) capsule, Take 1 Cap by mouth daily. , Disp: 90 Cap, Rfl: 3    methocarbamoL (Robaxin) 500 mg tablet, Take 1 Tab by mouth every eight to twelve (8-12) hours as needed for Muscle Spasm(s). , Disp: 30 Tab, Rfl: 1    cholecalciferol (Vitamin D3) 25 mcg (1,000 unit) cap, Take 1 Cap by mouth daily. , Disp: 90 Cap, Rfl: 3    amLODIPine (NORVASC) 10 mg tablet, Take 1 Tab by mouth daily. , Disp: 90 Tab, Rfl: 3    atorvastatin (LIPITOR) 20 mg tablet, Take 1 Tab by mouth daily. , Disp: 90 Tab, Rfl: 3    glucose blood VI test strips (FreeStyle Lite Strips) strip, by Does Not Apply route daily. , Disp: 300 Strip, Rfl: 3    lancets (FreeStyle Lancets) 28 gauge misc, USE 1  TO CHECK GLUCOSE ONCE DAILY, Disp: 300 Lancet, Rfl: 3    metFORMIN (GLUCOPHAGE) 500 mg tablet, One qam, 2 qpm, Disp: 270 Tab, Rfl: 3    metoprolol succinate (TOPROL-XL) 100 mg tablet, Take 1 Tab by mouth daily. , Disp: 90 Tab, Rfl: 3    ibuprofen (MOTRIN) 400 mg tablet, Take 1 Tab by mouth every eight (8) hours as needed for Pain., Disp: 90 Tab, Rfl: 3        No Known Allergies   MRI Results (most recent):  Results from Hospital Encounter encounter on 12/22/20    MRI BRAIN W WO CONT    Narrative  EXAM:  MRI BRAIN W WO CONT    INDICATION:    memory loss    COMPARISON:  None. CONTRAST: 15 ml Dotarem. TECHNIQUE:  Multiplanar multisequence acquisition without and with contrast of the brain. FINDINGS:  The ventricles are normal in size and position. There is a 4 mm T2 hypointense  lesion in the left frontal lobe with blooming on GRE images. There are otherwise  only minimal scattered nonspecific T2/FLAIR white matter hyperintensities. There  is no acute infarct, hemorrhage, extra-axial fluid collection, or mass effect. There is no cerebellar tonsillar herniation. Expected arterial flow-voids are  present. No evidence of abnormal enhancement. The paranasal sinuses, mastoid air cells, and middle ears are clear. The orbital  contents are within normal limits with bilateral lens implants. No significant  osseous or scalp lesions are identified. Impression  IMPRESSION:    1. No acute or significant intracranial abnormality. Minimal scattered  nonspecific T2/FLAIR white matter hyperintensities. 2. Incidental 4 mm lesion in the left frontal lobe with associated  susceptibility hypointensity, which may represent a small cavernous malformation  versus small chronic microhemorrhage. Results from [place] encounter on 12/22/20    MRI BRAIN W WO CONT    Narrative  EXAM:  MRI BRAIN W WO CONT    INDICATION:    memory loss    COMPARISON:  None. CONTRAST: 15 ml Dotarem. TECHNIQUE:  Multiplanar multisequence acquisition without and with contrast of the brain. FINDINGS:  The ventricles are normal in size and position.  There is a 4 mm T2 hypointense  lesion in the left frontal lobe with blooming on GRE images. There are otherwise  only minimal scattered nonspecific T2/FLAIR white matter hyperintensities. There  is no acute infarct, hemorrhage, extra-axial fluid collection, or mass effect. There is no cerebellar tonsillar herniation. Expected arterial flow-voids are  present. No evidence of abnormal enhancement. The paranasal sinuses, mastoid air cells, and middle ears are clear. The orbital  contents are within normal limits with bilateral lens implants. No significant  osseous or scalp lesions are identified. Impression  IMPRESSION:    1. No acute or significant intracranial abnormality. Minimal scattered  nonspecific T2/FLAIR white matter hyperintensities. 2. Incidental 4 mm lesion in the left frontal lobe with associated  susceptibility hypointensity, which may represent a small cavernous malformation  versus small chronic microhemorrhage. Review of Systems:  A comprehensive review of systems was negative except for: Constitutional: positive for fatigue and malaise  Eyes: positive for visual disturbance  Ears, nose, mouth, throat, and face: positive for hearing loss  Musculoskeletal: positive for myalgias, arthralgias and stiff joints  Neurological: positive for memory problems  Behvioral/Psych: positive for anxiety and depression   Vitals:    06/15/21 0910 06/15/21 0940   BP: (!) 140/67 130/80   Pulse: (!) 52 60   Resp:  12   SpO2: 99%    Weight: 115 lb (52.2 kg)    Height: 5' 3\" (1.6 m)      Objective:     I      NEUROLOGICAL EXAM:    Appearance: The patient is well developed, well nourished, provides a fair history and is in no acute distress.    Mental Status: Oriented to time, but missed the day of the month, place and person, and the president, patient could not do serial sevens, cannot spell world backward, could not remember 1 of 3 words at 30 seconds, and could not draw a clock that shows a time 10 minutes to rule out, cognitive function is abnormal and speech is fluent and no aphasia or dysarthria. Mood and affect appropriate but anxious and a little depressed. Cranial Nerves:   Intact visual fields. Fundi are benign, disc are flat, no lesions seen on funduscopy. TERRANCE, EOM's full, no nystagmus, no ptosis. Facial sensation is normal. Corneal reflexes are not tested. Facial movement is symmetric. Hearing is abnormal bilaterally. Palate is midline with normal sternocleidomastoid and trapezius muscles are normal. Tongue is midline. Neck without meningismus or bruits  Temporal arteries are not tender or enlarged  TMJ areas are not tender on palpation   Motor:  4/5 strength in upper and lower proximal and distal muscles. Normal bulk and tone. No fasciculations. Rapid alternating movement is symmetric and slow bilaterally   Reflexes:   Deep tendon reflexes 2+/4 and symmetrical.  No babinski or clonus present   Sensory:   Normal to touch, pinprick and vibration and temperature slightly decreased in both feet. DSS is intact   Gait:  Normal gait for patient's age, though patient does move slowly due to her arthritis. Tremor:   No tremor noted. Cerebellar:  No abnormal cerebellar signs present on Romberg and tandem testing and finger-nose-finger exam.   Neurovascular:  Normal heart sounds and regular rhythm, peripheral pulses decreased, and no carotid bruits. Assessment:       ICD-10-CM ICD-9-CM    1. Disturbance of memory  R41.3 780.93 memantine (NAMENDA) 5 mg tablet      memantine (Namenda) 10 mg tablet   2. Bilateral carotid artery stenosis  I65.23 433.10 memantine (NAMENDA) 5 mg tablet     433.30 memantine (Namenda) 10 mg tablet   3. 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  *      Plan:     Patient with memory loss that seems most consistent with amnestic form of dementia like Alzheimer's disease, and she needs additional cognitive enhancing agents, and agrees to try them, we will continue her on Aricept and start her on Namenda next, and the risk and benefits of Namenda explained again to the patient in detail and no side effects including GI side effects, constipation or diarrhea, dizziness syncope diarrhea or any side effect to call us immediately. She will take 5 mg each night at supper and then advance to 10 mg thereafter this was all explained to the patient and her daughter in detail. We reviewed the neuropsych testing in detail with the patient and daughter and reviewed the MRI scan with patient and her daughter in detail and all the lab tests with them. Patient was advised to stay mentally and physically active, exercise regular, read books, stay mentally active do crossword puzzles do computer work or any creative or hobby that stimulates the mind. She is advised to try to get 30 minutes of exercise a day mild exercise. Her MRI, and carotid Dopplers and lab tests were all okay for treatable causes of memory loss  She is encouraged to remain mentally and physically active, try to exercise regularly, read books, do problem solving puzzles and mental games, stay socially active, and eating good diet, control her blood pressure and her blood sugar, and her cholesterol and do not smoke to control her vascular risk factors as those are the 4 main vascular risk factors. She was taking multivitamin every day and continue her vitamin D. Her condition discussed with the patient and her niece in detail, and they agree with plans and therapy as above. Patient certainly would seem to be a candidate for cognitive enhancing agents if her neuropsych testing supports our presumed diagnosis  35 minutes spent with the patient and her family going over her history, reviewing her chart records, discussing her diagnosis prognosis, treatment needed, testing needed, and therapeutic options at this time.   Follow-up in 3 months time for consideration of starting Namenda   All this explained to the patient, all his test reviewed, time spent counseling the patient, 35 minutes spent with patient and daughter in detail today discussing all these problems and medicines and side effects of factors and going over all of her test in detail.     Signed By: Liana Douglass MD     Laurence 15, 2021       CC: Magda Wallace MD  FAX: [phone]

## 2021-08-09 ENCOUNTER — TELEPHONE (OUTPATIENT)
Dept: NEUROLOGY | Age: 86
End: 2021-08-09

## 2021-08-10 DIAGNOSIS — G30.1 ALZHEIMER'S TYPE DEMENTIA WITH LATE ONSET WITHOUT BEHAVIORAL DISTURBANCE (HCC): Primary | ICD-10-CM

## 2021-08-10 DIAGNOSIS — F02.80 ALZHEIMER'S TYPE DEMENTIA WITH LATE ONSET WITHOUT BEHAVIORAL DISTURBANCE (HCC): Primary | ICD-10-CM

## 2021-08-10 RX ORDER — MEMANTINE HYDROCHLORIDE 5 MG/1
5 TABLET ORAL DAILY
Qty: 30 TABLET | Refills: 11 | Status: SHIPPED | OUTPATIENT
Start: 2021-08-10 | End: 2021-09-03 | Stop reason: SDUPTHER

## 2021-09-03 DIAGNOSIS — I10 ESSENTIAL HYPERTENSION: ICD-10-CM

## 2021-09-03 DIAGNOSIS — R41.3 DISTURBANCE OF MEMORY: ICD-10-CM

## 2021-09-03 DIAGNOSIS — F02.80 ALZHEIMER'S TYPE DEMENTIA WITH LATE ONSET WITHOUT BEHAVIORAL DISTURBANCE (HCC): ICD-10-CM

## 2021-09-03 DIAGNOSIS — F51.01 PRIMARY INSOMNIA: ICD-10-CM

## 2021-09-03 DIAGNOSIS — I65.23 BILATERAL CAROTID ARTERY STENOSIS: ICD-10-CM

## 2021-09-03 DIAGNOSIS — I67.89 CEREBRAL MICROVASCULOPATHY: ICD-10-CM

## 2021-09-03 DIAGNOSIS — G30.1 ALZHEIMER'S TYPE DEMENTIA WITH LATE ONSET WITHOUT BEHAVIORAL DISTURBANCE (HCC): ICD-10-CM

## 2021-09-03 RX ORDER — METOPROLOL SUCCINATE 100 MG/1
100 TABLET, EXTENDED RELEASE ORAL DAILY
Qty: 90 TABLET | Refills: 3 | Status: SHIPPED | OUTPATIENT
Start: 2021-09-03

## 2021-09-03 RX ORDER — MEMANTINE HYDROCHLORIDE 5 MG/1
5 TABLET ORAL DAILY
Qty: 30 TABLET | Refills: 11 | Status: SHIPPED | OUTPATIENT
Start: 2021-09-03

## 2021-09-03 RX ORDER — ASPIRIN 81 MG/1
81 TABLET ORAL DAILY
Qty: 90 TABLET | Refills: 3 | Status: SHIPPED | OUTPATIENT
Start: 2021-09-03

## 2021-09-03 RX ORDER — HYDRALAZINE HYDROCHLORIDE 25 MG/1
25 TABLET, FILM COATED ORAL 3 TIMES DAILY
Qty: 90 TABLET | Refills: 3 | Status: SHIPPED | OUTPATIENT
Start: 2021-09-03

## 2021-09-03 RX ORDER — GLUCOSAM/CHONDRO/HERB 149/HYAL 750-100 MG
1 TABLET ORAL DAILY
Qty: 90 CAPSULE | Refills: 3 | Status: SHIPPED | OUTPATIENT
Start: 2021-09-03

## 2021-09-03 RX ORDER — AMLODIPINE BESYLATE 10 MG/1
10 TABLET ORAL DAILY
Qty: 90 TABLET | Refills: 3 | Status: SHIPPED | OUTPATIENT
Start: 2021-09-03

## 2021-09-03 RX ORDER — ATORVASTATIN CALCIUM 20 MG/1
20 TABLET, FILM COATED ORAL DAILY
Qty: 90 TABLET | Refills: 3 | Status: SHIPPED | OUTPATIENT
Start: 2021-09-03 | End: 2022-09-06 | Stop reason: SDUPTHER

## 2021-09-03 RX ORDER — METFORMIN HYDROCHLORIDE 500 MG/1
TABLET ORAL
Qty: 270 TABLET | Refills: 3 | Status: SHIPPED | OUTPATIENT
Start: 2021-09-03 | End: 2022-09-06 | Stop reason: SDUPTHER

## 2021-09-03 RX ORDER — LANCETS 28 GAUGE
EACH MISCELLANEOUS
Qty: 300 LANCET | Refills: 3 | Status: SHIPPED | OUTPATIENT
Start: 2021-09-03

## 2021-09-03 RX ORDER — IBUPROFEN 400 MG/1
400 TABLET ORAL
Qty: 90 TABLET | Refills: 3 | Status: SHIPPED | OUTPATIENT
Start: 2021-09-03

## 2021-09-03 RX ORDER — TEMAZEPAM 15 MG/1
CAPSULE ORAL
Qty: 30 CAPSULE | Refills: 5 | Status: SHIPPED | OUTPATIENT
Start: 2021-09-03 | End: 2022-03-24 | Stop reason: SDUPTHER

## 2021-09-03 RX ORDER — LISINOPRIL AND HYDROCHLOROTHIAZIDE 12.5; 2 MG/1; MG/1
1 TABLET ORAL DAILY
Qty: 90 TABLET | Refills: 1 | Status: SHIPPED | OUTPATIENT
Start: 2021-09-03

## 2021-09-03 RX ORDER — METHOCARBAMOL 500 MG/1
500 TABLET, FILM COATED ORAL
Qty: 30 TABLET | Refills: 1 | Status: SHIPPED | OUTPATIENT
Start: 2021-09-03

## 2021-09-03 RX ORDER — DONEPEZIL HYDROCHLORIDE 10 MG/1
TABLET, FILM COATED ORAL
Qty: 30 TABLET | Refills: 11 | Status: SHIPPED | OUTPATIENT
Start: 2021-09-03

## 2021-09-03 RX ORDER — GLUCOSAMINE SULFATE 1500 MG
1000 POWDER IN PACKET (EA) ORAL DAILY
Qty: 90 CAPSULE | Refills: 3 | Status: SHIPPED | OUTPATIENT
Start: 2021-09-03

## 2021-09-03 NOTE — TELEPHONE ENCOUNTER
----- Message from [address] [place] [address]. Amie Mckenzie sent at 9/3/2021 12:38 AM EDT -----  Regarding: Prescription Question  Contact: [phone]  Dr. Valdes Alert - This is Aline Morrissey, my mother will be moving with us this month. I am requesting a 90-day supply of her medication. We want to continue receiving the medications in the packs. Thank you very much.     Aline Morrissey, IV

## 2021-12-14 ENCOUNTER — DOCUMENTATION ONLY (OUTPATIENT)
Dept: INTERNAL MEDICINE CLINIC | Age: 86
End: 2021-12-14

## 2021-12-14 NOTE — PROGRESS NOTES
Left message for Carmen Rivera. We need more information on what medical record information is being requested.

## 2021-12-20 ENCOUNTER — DOCUMENTATION ONLY (OUTPATIENT)
Dept: INTERNAL MEDICINE CLINIC | Age: 86
End: 2021-12-20

## 2021-12-20 NOTE — PROGRESS NOTES
Received medical records request from [facility] on 12/20/21. Faxed request to [employer] on 12/20/21.

## 2022-03-18 PROBLEM — I67.89 CEREBRAL MICROVASCULOPATHY: Status: ACTIVE | Noted: 2020-12-07

## 2022-03-19 PROBLEM — E55.9 VITAMIN D DEFICIENCY: Status: ACTIVE | Noted: 2020-12-07

## 2022-03-19 PROBLEM — F02.80 ALZHEIMER'S TYPE DEMENTIA WITH LATE ONSET WITHOUT BEHAVIORAL DISTURBANCE (HCC): Status: ACTIVE | Noted: 2021-06-15

## 2022-03-19 PROBLEM — G30.1 ALZHEIMER'S TYPE DEMENTIA WITH LATE ONSET WITHOUT BEHAVIORAL DISTURBANCE (HCC): Status: ACTIVE | Noted: 2021-06-15

## 2022-03-19 PROBLEM — E03.4 HYPOTHYROIDISM DUE TO ACQUIRED ATROPHY OF THYROID: Status: ACTIVE | Noted: 2020-12-07

## 2022-03-19 PROBLEM — E11.51 TYPE 2 DIABETES MELLITUS WITH PERIPHERAL VASCULAR DISEASE (HCC): Status: ACTIVE | Noted: 2020-10-19

## 2022-03-19 PROBLEM — E53.8 B12 DEFICIENCY: Status: ACTIVE | Noted: 2020-12-07

## 2022-03-19 PROBLEM — R41.3 DISTURBANCE OF MEMORY: Status: ACTIVE | Noted: 2020-12-07

## 2022-03-20 PROBLEM — R41.82 ACUTE ALTERATION IN MENTAL STATUS: Status: ACTIVE | Noted: 2020-12-07

## 2022-03-20 PROBLEM — M85.88 OSTEOPENIA OF SPINE: Status: ACTIVE | Noted: 2018-03-08

## 2022-03-24 DIAGNOSIS — F51.01 PRIMARY INSOMNIA: ICD-10-CM

## 2022-03-24 RX ORDER — TEMAZEPAM 15 MG/1
CAPSULE ORAL
Qty: 30 CAPSULE | Refills: 5 | Status: SHIPPED | OUTPATIENT
Start: 2022-03-24

## 2022-03-24 NOTE — TELEPHONE ENCOUNTER
PCP: Rafia Jarrett MD    Last appt: 11/17/2021  No future appointments.     Requested Prescriptions     Pending Prescriptions Disp Refills    temazepam (RESTORIL) 15 mg capsule 30 Capsule 5     Sig: One qhs       Prior labs and Blood pressures:  BP Readings from Last 3 Encounters:   06/15/21 130/80   04/26/21 137/65   03/08/21 (!) 134/58     Lab Results   Component Value Date/Time    Sodium 139 04/26/2021 12:00 AM    Potassium 4.6 04/26/2021 12:00 AM    Chloride 96 04/26/2021 12:00 AM    CO2 23 04/26/2021 12:00 AM    Anion gap 10 07/30/2010 09:21 AM    Glucose 109 (H) 04/26/2021 12:00 AM    BUN 17 04/26/2021 12:00 AM    Creatinine 0.85 04/26/2021 12:00 AM    BUN/Creatinine ratio 20 04/26/2021 12:00 AM    GFR est AA 72 04/26/2021 12:00 AM    GFR est non-AA 63 04/26/2021 12:00 AM    Calcium 10.2 04/26/2021 12:00 AM     Lab Results   Component Value Date/Time    Hemoglobin A1c 6.6 (H) 04/26/2021 12:00 AM    Hemoglobin A1c (POC) 6.5 (A) 04/19/2016 03:34 PM    Hemoglobin A1c, External 7.0 on 10/02/2014 Dr. Chantel Saunders 10/02/2014 12:00 AM    Hemoglobin A1c, External 6.6 on 10/2/14 Dr. Chantel Saunders 10/02/2014 12:00 AM     Lab Results   Component Value Date/Time    Cholesterol, total 183 10/19/2020 11:19 AM    Cholesterol (POC) 160 01/28/2011 08:38 AM    HDL Cholesterol 78 10/19/2020 11:19 AM    HDL Cholesterol (POC) 59 01/28/2011 08:38 AM    LDL Cholesterol (POC) 45 01/28/2011 08:38 AM    LDL, calculated 88 10/19/2020 11:19 AM    LDL, calculated 73 10/23/2019 09:39 AM    VLDL, calculated 17 10/19/2020 11:19 AM    VLDL, calculated 12 10/23/2019 09:39 AM    Triglyceride 94 10/19/2020 11:19 AM    Triglycerides (POC) 281 01/28/2011 08:38 AM    CHOL/HDL Ratio 2.2 07/30/2010 09:21 AM     Lab Results   Component Value Date/Time    VITAMIN D, 25-HYDROXY 53.1 12/07/2020 11:06 AM       Lab Results   Component Value Date/Time    TSH 1.240 04/26/2021 12:00 AM

## 2022-09-06 RX ORDER — METFORMIN HYDROCHLORIDE 500 MG/1
TABLET ORAL
Qty: 270 TABLET | Refills: 3 | Status: SHIPPED | OUTPATIENT
Start: 2022-09-06

## 2022-09-06 RX ORDER — ATORVASTATIN CALCIUM 20 MG/1
20 TABLET, FILM COATED ORAL DAILY
Qty: 90 TABLET | Refills: 3 | Status: SHIPPED | OUTPATIENT
Start: 2022-09-06

## 2022-09-06 NOTE — TELEPHONE ENCOUNTER
PCP: Emily Saleem MD    Last appt: Visit date not found  No future appointments. Requested Prescriptions     Pending Prescriptions Disp Refills    metFORMIN (GLUCOPHAGE) 500 mg tablet 270 Tablet 3     Sig: One qam, 2 qpm    atorvastatin (LIPITOR) 20 mg tablet 90 Tablet 3     Sig: Take 1 Tablet by mouth daily.        Prior labs and Blood pressures:  BP Readings from Last 3 Encounters:   06/15/21 130/80   04/26/21 137/65   03/08/21 (!) 134/58     Lab Results   Component Value Date/Time    Sodium 139 04/26/2021 12:00 AM    Potassium 4.6 04/26/2021 12:00 AM    Chloride 96 04/26/2021 12:00 AM    CO2 23 04/26/2021 12:00 AM    Anion gap 10 07/30/2010 09:21 AM    Glucose 109 (H) 04/26/2021 12:00 AM    BUN 17 04/26/2021 12:00 AM    Creatinine 0.85 04/26/2021 12:00 AM    BUN/Creatinine ratio 20 04/26/2021 12:00 AM    GFR est AA 72 04/26/2021 12:00 AM    GFR est non-AA 63 04/26/2021 12:00 AM    Calcium 10.2 04/26/2021 12:00 AM     Lab Results   Component Value Date/Time    Hemoglobin A1c 6.6 (H) 04/26/2021 12:00 AM    Hemoglobin A1c (POC) 6.5 (A) 04/19/2016 03:34 PM    Hemoglobin A1c, External 7.0 on 10/02/2014 Dr. Demar Dallas 10/02/2014 12:00 AM    Hemoglobin A1c, External 6.6 on 10/2/14 Dr. Demar Dallas 10/02/2014 12:00 AM     Lab Results   Component Value Date/Time    Cholesterol, total 183 10/19/2020 11:19 AM    Cholesterol (POC) 160 01/28/2011 08:38 AM    HDL Cholesterol 78 10/19/2020 11:19 AM    HDL Cholesterol (POC) 59 01/28/2011 08:38 AM    LDL Cholesterol (POC) 45 01/28/2011 08:38 AM    LDL, calculated 88 10/19/2020 11:19 AM    LDL, calculated 73 10/23/2019 09:39 AM    VLDL, calculated 17 10/19/2020 11:19 AM    VLDL, calculated 12 10/23/2019 09:39 AM    Triglyceride 94 10/19/2020 11:19 AM    Triglycerides (POC) 281 01/28/2011 08:38 AM    CHOL/HDL Ratio 2.2 07/30/2010 09:21 AM     Lab Results   Component Value Date/Time    VITAMIN D, 25-HYDROXY 53.1 12/07/2020 11:06 AM       Lab Results Component Value Date/Time    TSH 1.240 04/26/2021 12:00 AM